# Patient Record
Sex: FEMALE | Race: WHITE | NOT HISPANIC OR LATINO
[De-identification: names, ages, dates, MRNs, and addresses within clinical notes are randomized per-mention and may not be internally consistent; named-entity substitution may affect disease eponyms.]

---

## 2017-10-02 ENCOUNTER — RX RENEWAL (OUTPATIENT)
Age: 77
End: 2017-10-02

## 2017-10-02 ENCOUNTER — MEDICATION RENEWAL (OUTPATIENT)
Age: 77
End: 2017-10-02

## 2018-06-05 ENCOUNTER — APPOINTMENT (OUTPATIENT)
Dept: NEPHROLOGY | Facility: CLINIC | Age: 78
End: 2018-06-05
Payer: MEDICARE

## 2018-06-05 VITALS — DIASTOLIC BLOOD PRESSURE: 82 MMHG | SYSTOLIC BLOOD PRESSURE: 148 MMHG

## 2018-06-05 VITALS
DIASTOLIC BLOOD PRESSURE: 85 MMHG | HEIGHT: 60 IN | WEIGHT: 164.68 LBS | BODY MASS INDEX: 32.33 KG/M2 | HEART RATE: 77 BPM | SYSTOLIC BLOOD PRESSURE: 141 MMHG | OXYGEN SATURATION: 97 %

## 2018-06-05 PROCEDURE — 99214 OFFICE O/P EST MOD 30 MIN: CPT

## 2018-06-27 LAB
25(OH)D3 SERPL-MCNC: 28.5 NG/ML
ALBUMIN SERPL ELPH-MCNC: 4.4 G/DL
ALP BLD-CCNC: 80 U/L
ALT SERPL-CCNC: 18 U/L
ANION GAP SERPL CALC-SCNC: 14 MMOL/L
APPEARANCE: CLEAR
AST SERPL-CCNC: 21 U/L
BACTERIA UR CULT: NORMAL
BACTERIA: NEGATIVE
BASOPHILS # BLD AUTO: 0.04 K/UL
BASOPHILS NFR BLD AUTO: 0.8 %
BILIRUB SERPL-MCNC: 0.6 MG/DL
BILIRUBIN URINE: NEGATIVE
BLOOD URINE: ABNORMAL
BUN SERPL-MCNC: 20 MG/DL
CALCIUM SERPL-MCNC: 9.8 MG/DL
CHLORIDE SERPL-SCNC: 101 MMOL/L
CHOLEST SERPL-MCNC: 229 MG/DL
CHOLEST/HDLC SERPL: 3.8 RATIO
CO2 SERPL-SCNC: 28 MMOL/L
COLOR: YELLOW
CREAT SERPL-MCNC: 0.83 MG/DL
CREAT SPEC-SCNC: 97 MG/DL
CREAT SPEC-SCNC: 97 MG/DL
CREAT/PROT UR: 0.1 RATIO
EOSINOPHIL # BLD AUTO: 0.1 K/UL
EOSINOPHIL NFR BLD AUTO: 2 %
FERRITIN SERPL-MCNC: 81 NG/ML
GLUCOSE QUALITATIVE U: NEGATIVE MG/DL
GLUCOSE SERPL-MCNC: 102 MG/DL
HBA1C MFR BLD HPLC: 5.4 %
HCT VFR BLD CALC: 45.7 %
HDLC SERPL-MCNC: 61 MG/DL
HGB BLD-MCNC: 14.6 G/DL
HYALINE CASTS: 1 /LPF
IMM GRANULOCYTES NFR BLD AUTO: 0.2 %
IRON SATN MFR SERPL: 25 %
IRON SERPL-MCNC: 85 UG/DL
KETONES URINE: NEGATIVE
LDLC SERPL CALC-MCNC: 146 MG/DL
LEUKOCYTE ESTERASE URINE: ABNORMAL
LYMPHOCYTES # BLD AUTO: 1.97 K/UL
LYMPHOCYTES NFR BLD AUTO: 39.5 %
MAGNESIUM SERPL-MCNC: 1.8 MG/DL
MAN DIFF?: NORMAL
MCHC RBC-ENTMCNC: 30 PG
MCHC RBC-ENTMCNC: 31.9 GM/DL
MCV RBC AUTO: 93.8 FL
MICROALBUMIN 24H UR DL<=1MG/L-MCNC: 4.4 MG/DL
MICROALBUMIN/CREAT 24H UR-RTO: 45 MG/G
MICROSCOPIC-UA: NORMAL
MONOCYTES # BLD AUTO: 0.32 K/UL
MONOCYTES NFR BLD AUTO: 6.4 %
NEUTROPHILS # BLD AUTO: 2.55 K/UL
NEUTROPHILS NFR BLD AUTO: 51.1 %
NITRITE URINE: NEGATIVE
PH URINE: 6.5
PHOSPHATE SERPL-MCNC: 3.3 MG/DL
PLATELET # BLD AUTO: 259 K/UL
POTASSIUM SERPL-SCNC: 4.6 MMOL/L
PROT SERPL-MCNC: 6.9 G/DL
PROT UR-MCNC: 13 MG/DL
PROTEIN URINE: ABNORMAL MG/DL
RBC # BLD: 4.87 M/UL
RBC # FLD: 13.3 %
RED BLOOD CELLS URINE: 2 /HPF
SODIUM SERPL-SCNC: 143 MMOL/L
SPECIFIC GRAVITY URINE: 1.02
SQUAMOUS EPITHELIAL CELLS: 3 /HPF
TIBC SERPL-MCNC: 339 UG/DL
TRIGL SERPL-MCNC: 110 MG/DL
TSH SERPL-ACNC: 1.69 UIU/ML
UIBC SERPL-MCNC: 254 UG/DL
URATE SERPL-MCNC: 5.2 MG/DL
UROBILINOGEN URINE: NEGATIVE MG/DL
WBC # FLD AUTO: 4.99 K/UL
WHITE BLOOD CELLS URINE: 10 /HPF

## 2018-10-08 ENCOUNTER — MEDICATION RENEWAL (OUTPATIENT)
Age: 78
End: 2018-10-08

## 2018-10-25 ENCOUNTER — APPOINTMENT (OUTPATIENT)
Dept: NEPHROLOGY | Facility: CLINIC | Age: 78
End: 2018-10-25
Payer: MEDICARE

## 2018-10-25 VITALS — SYSTOLIC BLOOD PRESSURE: 178 MMHG | DIASTOLIC BLOOD PRESSURE: 80 MMHG

## 2018-10-25 VITALS — BODY MASS INDEX: 29.69 KG/M2 | WEIGHT: 152 LBS

## 2018-10-25 PROCEDURE — 99214 OFFICE O/P EST MOD 30 MIN: CPT

## 2018-10-26 LAB
ALBUMIN SERPL ELPH-MCNC: 4.4 G/DL
ANION GAP SERPL CALC-SCNC: 13 MMOL/L
APPEARANCE: CLEAR
BACTERIA: NEGATIVE
BASOPHILS # BLD AUTO: 0.04 K/UL
BASOPHILS NFR BLD AUTO: 0.8 %
BILIRUBIN URINE: NEGATIVE
BLOOD URINE: NEGATIVE
BUN SERPL-MCNC: 16 MG/DL
CALCIUM SERPL-MCNC: 9.8 MG/DL
CHLORIDE SERPL-SCNC: 100 MMOL/L
CHOLEST SERPL-MCNC: 199 MG/DL
CHOLEST/HDLC SERPL: 2.9 RATIO
CO2 SERPL-SCNC: 28 MMOL/L
COLOR: YELLOW
CREAT SERPL-MCNC: 0.77 MG/DL
CREAT SPEC-SCNC: 57 MG/DL
CREAT/PROT UR: 0.2 RATIO
EOSINOPHIL # BLD AUTO: 0.06 K/UL
EOSINOPHIL NFR BLD AUTO: 1.1 %
FERRITIN SERPL-MCNC: 154 NG/ML
GLUCOSE QUALITATIVE U: NEGATIVE MG/DL
GLUCOSE SERPL-MCNC: 102 MG/DL
HBA1C MFR BLD HPLC: 5.3 %
HCT VFR BLD CALC: 40.8 %
HDLC SERPL-MCNC: 68 MG/DL
HGB BLD-MCNC: 13.9 G/DL
HYALINE CASTS: 1 /LPF
IMM GRANULOCYTES NFR BLD AUTO: 0.4 %
IRON SATN MFR SERPL: 17 %
IRON SERPL-MCNC: 52 UG/DL
KETONES URINE: NEGATIVE
LDLC SERPL CALC-MCNC: 113 MG/DL
LEUKOCYTE ESTERASE URINE: ABNORMAL
LYMPHOCYTES # BLD AUTO: 1.84 K/UL
LYMPHOCYTES NFR BLD AUTO: 35.1 %
MAGNESIUM SERPL-MCNC: 1.8 MG/DL
MAN DIFF?: NORMAL
MCHC RBC-ENTMCNC: 31.2 PG
MCHC RBC-ENTMCNC: 34.1 GM/DL
MCV RBC AUTO: 91.7 FL
MICROSCOPIC-UA: NORMAL
MONOCYTES # BLD AUTO: 0.43 K/UL
MONOCYTES NFR BLD AUTO: 8.2 %
NEUTROPHILS # BLD AUTO: 2.85 K/UL
NEUTROPHILS NFR BLD AUTO: 54.4 %
NITRITE URINE: NEGATIVE
PH URINE: 7
PHOSPHATE SERPL-MCNC: 3.4 MG/DL
PLATELET # BLD AUTO: 235 K/UL
POTASSIUM SERPL-SCNC: 3.8 MMOL/L
PROT UR-MCNC: 10 MG/DL
PROTEIN URINE: NEGATIVE MG/DL
RBC # BLD: 4.45 M/UL
RBC # FLD: 13.4 %
RED BLOOD CELLS URINE: 4 /HPF
SODIUM SERPL-SCNC: 141 MMOL/L
SPECIFIC GRAVITY URINE: 1.01
SQUAMOUS EPITHELIAL CELLS: 4 /HPF
TIBC SERPL-MCNC: 305 UG/DL
TRIGL SERPL-MCNC: 88 MG/DL
UIBC SERPL-MCNC: 253 UG/DL
URATE SERPL-MCNC: 4 MG/DL
UROBILINOGEN URINE: NEGATIVE MG/DL
WBC # FLD AUTO: 5.24 K/UL
WHITE BLOOD CELLS URINE: 10 /HPF

## 2018-10-31 ENCOUNTER — FORM ENCOUNTER (OUTPATIENT)
Age: 78
End: 2018-10-31

## 2018-10-31 ENCOUNTER — APPOINTMENT (OUTPATIENT)
Dept: INTERNAL MEDICINE | Facility: CLINIC | Age: 78
End: 2018-10-31
Payer: MEDICARE

## 2018-10-31 VITALS
SYSTOLIC BLOOD PRESSURE: 138 MMHG | BODY MASS INDEX: 30.04 KG/M2 | DIASTOLIC BLOOD PRESSURE: 74 MMHG | RESPIRATION RATE: 14 BRPM | TEMPERATURE: 98.5 F | HEIGHT: 60 IN | OXYGEN SATURATION: 97 % | WEIGHT: 153 LBS | HEART RATE: 82 BPM

## 2018-10-31 PROCEDURE — 99214 OFFICE O/P EST MOD 30 MIN: CPT

## 2018-10-31 PROCEDURE — 90662 IIV NO PRSV INCREASED AG IM: CPT

## 2018-10-31 PROCEDURE — G0008: CPT

## 2018-10-31 NOTE — HISTORY OF PRESENT ILLNESS
[FreeTextEntry8] : \par 77 yo F pmhx HTN 2/2 hx b/l fibromuscular dysplasia s/p unilateral angioplasty, preDM, HLD, right sciatica here for acute visit\par \par c/o right buttock pain on/off since spring 2018, most recent x 2 weeks\par -sharp focal pain, on/off, not felt with laying down or sitting, onset with walking mainly\par -denies pain radiation or lower back involvement, trouble with ambulation, incontinence, paresthesias or focal weakness\par -denies hx trauma; has been very active in recent travels to [place], [place], [place] and [place] in recent months since 6/18-8/18 w/o sx's or limitations\par -taking aleve 600 mg qd x few days- does not feel has helped.  Last taken > 24 hrs ago\par -hx Tylenol trial w/o help per pt\par -hx eval by ortho at onset in 5/18- told likely arthritis and advised PT, denies hx imaging.  Did PT - did not feel helped.\par -following with chiropractor x few days now, seen TIW for nerve stimulator use with some improvement noted.  Has had similar tx's in past with help, last was several months ago.\par -using heating pad with help\par -interested in muscle relaxer trial \par \par hx right sided sciatica- onset > 50 yrs ago after delivery of her child, has been on/off since- current pain is similar to prior, but more painful in past 2 weeks.  10/10 at its worst.\par \par hx anxiety- recent stress 2/2 losing job recently, daughters live and worries aboth them as with mental illness (bipolar)- one moving out soon\par -following with therapist with help\par -using klonopin prn on occasion\par \par Denies GI or  complaints.\par

## 2018-10-31 NOTE — PHYSICAL EXAM
[No Acute Distress] : no acute distress [Well-Appearing] : well-appearing [Normal Sclera/Conjunctiva] : normal sclera/conjunctiva [PERRL] : pupils equal round and reactive to light [EOMI] : extraocular movements intact [Supple] : supple [No Respiratory Distress] : no respiratory distress  [Clear to Auscultation] : lungs were clear to auscultation bilaterally [Normal Rate] : normal rate  [Regular Rhythm] : with a regular rhythm [Normal S1, S2] : normal S1 and S2 [No Murmur] : no murmur heard [Pedal Pulses Present] : the pedal pulses are present [No Edema] : there was no peripheral edema [Soft] : abdomen soft [Non Tender] : non-tender [No HSM] : no HSM [No CVA Tenderness] : no CVA  tenderness [No Spinal Tenderness] : no spinal tenderness [No Joint Swelling] : no joint swelling [Grossly Normal Strength/Tone] : grossly normal strength/tone [No Rash] : no rash [Normal Gait] : normal gait [No Focal Deficits] : no focal deficits [Normal Affect] : the affect was normal [de-identified] : b/l hips: FROM w/o pain  right buttock: diffuse pain at region of sciatic nerve, no rash

## 2018-10-31 NOTE — ASSESSMENT
[FreeTextEntry1] : \par 79 yo F pmhx HTN 2/2 hx b/l fibromuscular dysplasia s/p unilateral angioplasty, preDM (10/18 A1c 5.3), HLD, right sciatica here for acute visit\par \par hx sciatica- recent recurrence\par -check xrays LS-spine and right hip/pelvis (no hx prior)\par -ortho referral, encouraged\par -followed by diropractor, getting nerve stimulation with help\par -cont. heat prn\par -trial of flexeril qhs- SEs including sedation risk counseled, advised to avoid concurrent ETOH and klonopin use\par -declines oral pain pills, advised to avoid NSAIDs due to hx HTN and renal issues as may worsen- confirms understanding.  Advised trial of OTC topical lidocaine.\par -declines PT referral\par \par anxiety, stress-\par -cont f/u with therapist\par -on Klonopin prn\par \par HTN- BP wnl today\par -followed by renal\par -advised to avoid NSAIDs\par \par \par HCM\par -hx CPE 10/16 with PMD Dr. Bishop- yearly advised\par -flu shot today\par \par Pt's cell; [phone]

## 2018-10-31 NOTE — REVIEW OF SYSTEMS
[Negative] : Neurological [Fever] : no fever [Chills] : no chills [Chest Pain] : no chest pain [Palpitations] : no palpitations [Cough] : no cough [Dyspnea on Exertion] : no dyspnea on exertion [FreeTextEntry9] : see HPI [de-identified] : see HPI

## 2018-11-01 ENCOUNTER — OUTPATIENT (OUTPATIENT)
Dept: OUTPATIENT SERVICES | Facility: HOSPITAL | Age: 78
LOS: 1 days | End: 2018-11-01
Payer: MEDICARE

## 2018-11-01 ENCOUNTER — APPOINTMENT (OUTPATIENT)
Dept: ULTRASOUND IMAGING | Facility: CLINIC | Age: 78
End: 2018-11-01
Payer: MEDICARE

## 2018-11-01 ENCOUNTER — APPOINTMENT (OUTPATIENT)
Dept: RADIOLOGY | Facility: CLINIC | Age: 78
End: 2018-11-01
Payer: MEDICARE

## 2018-11-01 DIAGNOSIS — M54.30 SCIATICA, UNSPECIFIED SIDE: ICD-10-CM

## 2018-11-01 PROCEDURE — 73502 X-RAY EXAM HIP UNI 2-3 VIEWS: CPT

## 2018-11-01 PROCEDURE — 72120 X-RAY BEND ONLY L-S SPINE: CPT | Mod: 26

## 2018-11-01 PROCEDURE — 93975 VASCULAR STUDY: CPT | Mod: 26

## 2018-11-01 PROCEDURE — 73502 X-RAY EXAM HIP UNI 2-3 VIEWS: CPT | Mod: 26,RT

## 2018-11-01 PROCEDURE — 72120 X-RAY BEND ONLY L-S SPINE: CPT

## 2018-11-01 PROCEDURE — 93975 VASCULAR STUDY: CPT

## 2019-01-09 ENCOUNTER — APPOINTMENT (OUTPATIENT)
Dept: INTERNAL MEDICINE | Facility: CLINIC | Age: 79
End: 2019-01-09
Payer: MEDICARE

## 2019-01-09 VITALS
WEIGHT: 150 LBS | HEIGHT: 60 IN | DIASTOLIC BLOOD PRESSURE: 80 MMHG | RESPIRATION RATE: 18 BRPM | SYSTOLIC BLOOD PRESSURE: 134 MMHG | BODY MASS INDEX: 29.45 KG/M2 | HEART RATE: 85 BPM

## 2019-01-09 PROCEDURE — G0009: CPT

## 2019-01-09 PROCEDURE — G0439: CPT

## 2019-01-09 PROCEDURE — 36415 COLL VENOUS BLD VENIPUNCTURE: CPT

## 2019-01-09 PROCEDURE — 90670 PCV13 VACCINE IM: CPT

## 2019-01-09 NOTE — PHYSICAL EXAM
[No Acute Distress] : no acute distress [Well Nourished] : well nourished [Well Developed] : well developed [Well-Appearing] : well-appearing [Normal Sclera/Conjunctiva] : normal sclera/conjunctiva [PERRL] : pupils equal round and reactive to light [EOMI] : extraocular movements intact [Normal Outer Ear/Nose] : the outer ears and nose were normal in appearance [Normal Oropharynx] : the oropharynx was normal [No JVD] : no jugular venous distention [Supple] : supple [No Lymphadenopathy] : no lymphadenopathy [Thyroid Normal, No Nodules] : the thyroid was normal and there were no nodules present [No Respiratory Distress] : no respiratory distress  [Clear to Auscultation] : lungs were clear to auscultation bilaterally [No Accessory Muscle Use] : no accessory muscle use [Normal Rate] : normal rate  [Regular Rhythm] : with a regular rhythm [No Varicosities] : no varicosities [No Edema] : there was no peripheral edema [No Extremity Clubbing/Cyanosis] : no extremity clubbing/cyanosis [Soft] : abdomen soft [Non Tender] : non-tender [No HSM] : no HSM [Normal Bowel Sounds] : normal bowel sounds [Normal Supraclavicular Nodes] : no supraclavicular lymphadenopathy [Normal Posterior Cervical Nodes] : no posterior cervical lymphadenopathy [Normal Anterior Cervical Nodes] : no anterior cervical lymphadenopathy [No CVA Tenderness] : no CVA  tenderness [No Spinal Tenderness] : no spinal tenderness [No Joint Swelling] : no joint swelling [Grossly Normal Strength/Tone] : grossly normal strength/tone [No Rash] : no rash [No Skin Lesions] : no skin lesions [Speech Grossly Normal] : speech grossly normal [Memory Grossly Normal] : memory grossly normal [Normal Mood] : the mood was normal [Normal Insight/Judgement] : insight and judgment were intact

## 2019-01-09 NOTE — HEALTH RISK ASSESSMENT
[Good] : ~his/her~  mood as  good [] : No [No falls in past year] : Patient reported no falls in the past year [0] : 2) Feeling down, depressed, or hopeless: Not at all (0) [de-identified] : daily - wine  [Change in mental status noted] : No change in mental status noted [None] : None [With Family] : lives with family [Unemployed] : unemployed [Graduate School] : graduate school [] :  [Sexually Active] : not sexually active [Feels Safe at Home] : Feels safe at home [Fully functional (bathing, dressing, toileting, transferring, walking, feeding)] : Fully functional (bathing, dressing, toileting, transferring, walking, feeding) [Fully functional (using the telephone, shopping, preparing meals, housekeeping, doing laundry, using] : Fully functional and needs no help or supervision to perform IADLs (using the telephone, shopping, preparing meals, housekeeping, doing laundry, using transportation, managing medications and managing finances) [Reports changes in hearing] : Reports no changes in hearing [Reports changes in vision] : Reports changes in vision [Reports changes in dental health] : Reports changes in dental health [Smoke Detector] : smoke detector [Carbon Monoxide Detector] : carbon monoxide detector [MammogramDate] : needed  [BoneDensityDate] : needed  [ColonoscopyDate] : needed  [Discussed at today's visit] : Advance Directives Discussed at today's visit [Name: ___] : Health Care Proxy's Name: [unfilled]  [Relationship: ___] : Relationship: [unfilled]

## 2019-01-09 NOTE — ASSESSMENT
[FreeTextEntry1] : # Wellness check \par \par - diet / exercise discussed \par - LDL - 113/ A1C 5.3\par -check vit D \par - home safety / fall prevention discussed \par - pt needs mammo / BMD / colonoscopy \par - prevnar needed \par - optho follow up \par \par \par # HTN \par - stable\par \par # HL \par refuses to try a different med\par \par \par # anxiety \par - sees psychotherapy

## 2019-01-09 NOTE — HISTORY OF PRESENT ILLNESS
[FreeTextEntry1] : wellness check\par she is  \par lives w/ adult children \par taking all meds \par \par recently finished her Ph D \par not taking lipid lowering meds bc myalgias\par \par has not had mammo / BMD / colonoscopy \par no vaginal bleeding

## 2019-01-10 LAB
25(OH)D3 SERPL-MCNC: 27.1 NG/ML
ALBUMIN SERPL ELPH-MCNC: 4.3 G/DL
ALP BLD-CCNC: 82 U/L
ALT SERPL-CCNC: 17 U/L
AST SERPL-CCNC: 22 U/L
BILIRUB DIRECT SERPL-MCNC: 0.1 MG/DL
BILIRUB INDIRECT SERPL-MCNC: 0.3 MG/DL
BILIRUB SERPL-MCNC: 0.4 MG/DL
PROT SERPL-MCNC: 7 G/DL
TSH SERPL-ACNC: 1.32 UIU/ML

## 2019-01-23 ENCOUNTER — FORM ENCOUNTER (OUTPATIENT)
Age: 79
End: 2019-01-23

## 2019-01-24 ENCOUNTER — APPOINTMENT (OUTPATIENT)
Dept: MAMMOGRAPHY | Facility: CLINIC | Age: 79
End: 2019-01-24
Payer: MEDICARE

## 2019-01-24 ENCOUNTER — APPOINTMENT (OUTPATIENT)
Dept: RADIOLOGY | Facility: CLINIC | Age: 79
End: 2019-01-24
Payer: MEDICARE

## 2019-01-24 ENCOUNTER — OUTPATIENT (OUTPATIENT)
Dept: OUTPATIENT SERVICES | Facility: HOSPITAL | Age: 79
LOS: 1 days | End: 2019-01-24
Payer: MEDICARE

## 2019-01-24 DIAGNOSIS — Z00.8 ENCOUNTER FOR OTHER GENERAL EXAMINATION: ICD-10-CM

## 2019-01-24 PROCEDURE — 77067 SCR MAMMO BI INCL CAD: CPT | Mod: 26

## 2019-01-24 PROCEDURE — 77063 BREAST TOMOSYNTHESIS BI: CPT | Mod: 26

## 2019-01-24 PROCEDURE — 77080 DXA BONE DENSITY AXIAL: CPT

## 2019-01-24 PROCEDURE — 77063 BREAST TOMOSYNTHESIS BI: CPT

## 2019-01-24 PROCEDURE — 77067 SCR MAMMO BI INCL CAD: CPT

## 2019-01-24 PROCEDURE — 77080 DXA BONE DENSITY AXIAL: CPT | Mod: 26

## 2019-01-28 ENCOUNTER — FORM ENCOUNTER (OUTPATIENT)
Age: 79
End: 2019-01-28

## 2019-01-29 ENCOUNTER — APPOINTMENT (OUTPATIENT)
Dept: MAMMOGRAPHY | Facility: CLINIC | Age: 79
End: 2019-01-29
Payer: MEDICARE

## 2019-01-29 ENCOUNTER — OTHER (OUTPATIENT)
Age: 79
End: 2019-01-29

## 2019-01-29 ENCOUNTER — APPOINTMENT (OUTPATIENT)
Dept: ULTRASOUND IMAGING | Facility: CLINIC | Age: 79
End: 2019-01-29
Payer: MEDICARE

## 2019-01-29 ENCOUNTER — OUTPATIENT (OUTPATIENT)
Dept: OUTPATIENT SERVICES | Facility: HOSPITAL | Age: 79
LOS: 1 days | End: 2019-01-29
Payer: MEDICARE

## 2019-01-29 DIAGNOSIS — Z00.8 ENCOUNTER FOR OTHER GENERAL EXAMINATION: ICD-10-CM

## 2019-01-29 PROCEDURE — 76642 ULTRASOUND BREAST LIMITED: CPT

## 2019-01-29 PROCEDURE — 77065 DX MAMMO INCL CAD UNI: CPT | Mod: 26,RT

## 2019-01-29 PROCEDURE — 76642 ULTRASOUND BREAST LIMITED: CPT | Mod: 26,RT

## 2019-01-29 PROCEDURE — G0279: CPT | Mod: 26

## 2019-01-29 PROCEDURE — G0279: CPT

## 2019-01-29 PROCEDURE — 77065 DX MAMMO INCL CAD UNI: CPT

## 2019-02-01 ENCOUNTER — APPOINTMENT (OUTPATIENT)
Dept: NEPHROLOGY | Facility: CLINIC | Age: 79
End: 2019-02-01
Payer: MEDICARE

## 2019-02-01 VITALS
BODY MASS INDEX: 30.51 KG/M2 | HEIGHT: 60 IN | HEART RATE: 79 BPM | DIASTOLIC BLOOD PRESSURE: 73 MMHG | WEIGHT: 155.42 LBS | SYSTOLIC BLOOD PRESSURE: 143 MMHG | OXYGEN SATURATION: 98 %

## 2019-02-01 PROCEDURE — 99214 OFFICE O/P EST MOD 30 MIN: CPT

## 2019-02-01 RX ORDER — CLONAZEPAM 0.5 MG/1
0.5 TABLET ORAL
Refills: 0 | Status: DISCONTINUED | COMMUNITY
End: 2019-02-01

## 2019-02-01 RX ORDER — CYCLOBENZAPRINE HYDROCHLORIDE 5 MG/1
5 TABLET, FILM COATED ORAL
Qty: 20 | Refills: 0 | Status: DISCONTINUED | COMMUNITY
Start: 2018-10-31 | End: 2019-02-01

## 2019-02-01 NOTE — ASSESSMENT
[FreeTextEntry1] : Ms. Chavez is a 78 years old woman with FMD who presents today for follow up.\par \par Hypertension -- Secondary to fibromuscular dysplasia.  The patient's blood pressure is not presently optimized.  This is in the setting of a very salty meal last night as well as meeting a new doctor.  I explained to her that her risk for stroke and cardiovascular events is lower with a systolic closer to 120.  Therefore, we have agreed to increase her HCTZ to 50 daily to bring her slightly closer to her goal.  She will call me with her blood pressure results in one week and she will follow up to see me in six months.

## 2019-02-01 NOTE — REVIEW OF SYSTEMS
[Fever] : no fever [Chills] : no chills [Feeling Poorly] : not feeling poorly [Feeling Tired] : not feeling tired [Eyesight Problems] : no eyesight problems [Nosebleeds] : no nosebleeds [Chest Pain] : no chest pain [Lower Ext Edema] : no lower extremity edema [Shortness Of Breath] : no shortness of breath [Wheezing] : no wheezing [Cough] : no cough [SOB on Exertion] : no shortness of breath during exertion [Abdominal Pain] : no abdominal pain [Vomiting] : no vomiting [Constipation] : no constipation [Dysuria] : no dysuria [Incontinence] : no incontinence [Arthralgias] : no arthralgias [Joint Pain] : no joint pain [Itching] : no itching [Dizziness] : no dizziness [Fainting] : no fainting [Anxiety] : no anxiety [Depression] : no depression [Easy Bleeding] : no tendency for easy bleeding [Easy Bruising] : no tendency for easy bruising

## 2019-02-01 NOTE — PHYSICAL EXAM
[General Appearance - Alert] : alert [General Appearance - In No Acute Distress] : in no acute distress [General Appearance - Well Nourished] : well nourished [Outer Ear] : the ears and nose were normal in appearance [Examination Of The Oral Cavity] : the lips and gums were normal [Oropharynx] : the oropharynx was normal [Neck Appearance] : the appearance of the neck was normal [Neck Cervical Mass (___cm)] : no neck mass was observed [Jugular Venous Distention Increased] : there was no jugular-venous distention [Thyroid Nodule] : there were no palpable thyroid nodules [Auscultation Breath Sounds / Voice Sounds] : lungs were clear to auscultation bilaterally [Apical Impulse] : the apical impulse was normal [Heart Sounds] : normal S1 and S2 [Murmurs] : no murmurs [Edema] : there was no peripheral edema [Bowel Sounds] : normal bowel sounds [Abdomen Soft] : soft [Abdomen Tenderness] : non-tender [FreeTextEntry1] : not examined [Cervical Lymph Nodes Enlarged Posterior Bilaterally] : posterior cervical [Supraclavicular Lymph Nodes Enlarged Bilaterally] : supraclavicular [No CVA Tenderness] : no ~M costovertebral angle tenderness [Abnormal Walk] : normal gait [Involuntary Movements] : no involuntary movements were seen [Skin Color & Pigmentation] : normal skin color and pigmentation [Skin Turgor] : normal skin turgor [] : no rash [Motor Exam] : the motor exam was normal [Oriented To Time, Place, And Person] : oriented to person, place, and time [Impaired Insight] : insight and judgment were intact [Affect] : the affect was normal

## 2019-02-01 NOTE — HISTORY OF PRESENT ILLNESS
[FreeTextEntry1] : Today I had the pleasure of meeting Charo Chavez.  She is a 78 years old woman with an extraordinary medical and personal history.  Although she is [place] she spent the early part of her life with her  and young son traveling and backpacking across the entire world.  She has a history of bilateral fibromuscular dysplasia and is s/p unilateral angioplasty.  Her blood pressure has been well controlled over the years and recently she's been doing well on hctz 25 and losartan 100.  Of late her blood pressure has been rising.  She reports that she just returned from her trip to [place] and she had a great time there.  Since returning she changed her diet around a lot and has lost weight.  Last night she went out to eat with her daughter's boss and had an extraordinarily salty meal.\par \par 2/1/19 -- Since our last meeting Charo has had quite a few positive things in her personal / professional life.  She has been asked to help author a book, she has been asked to speak.  Overall doing quite well.  She had an abnormal mammogram recently and so she has to go and get a breast biopsy.  Her blood pressure has been slightly on the higher side, still.  A repeat doppler done after last visit did not show any evidence of FMD.

## 2019-02-05 ENCOUNTER — FORM ENCOUNTER (OUTPATIENT)
Age: 79
End: 2019-02-05

## 2019-02-06 ENCOUNTER — APPOINTMENT (OUTPATIENT)
Dept: MAMMOGRAPHY | Facility: CLINIC | Age: 79
End: 2019-02-06
Payer: MEDICARE

## 2019-02-06 ENCOUNTER — OUTPATIENT (OUTPATIENT)
Dept: OUTPATIENT SERVICES | Facility: HOSPITAL | Age: 79
LOS: 1 days | End: 2019-02-06
Payer: MEDICARE

## 2019-02-06 ENCOUNTER — RESULT REVIEW (OUTPATIENT)
Age: 79
End: 2019-02-06

## 2019-02-06 DIAGNOSIS — R92.0 MAMMOGRAPHIC MICROCALCIFICATION FOUND ON DIAGNOSTIC IMAGING OF BREAST: ICD-10-CM

## 2019-02-06 PROCEDURE — 77065 DX MAMMO INCL CAD UNI: CPT

## 2019-02-06 PROCEDURE — 19081 BX BREAST 1ST LESION STRTCTC: CPT

## 2019-02-06 PROCEDURE — 88305 TISSUE EXAM BY PATHOLOGIST: CPT | Mod: 26

## 2019-02-06 PROCEDURE — 19081 BX BREAST 1ST LESION STRTCTC: CPT | Mod: RT

## 2019-02-06 PROCEDURE — 88360 TUMOR IMMUNOHISTOCHEM/MANUAL: CPT | Mod: 26

## 2019-02-06 PROCEDURE — 77065 DX MAMMO INCL CAD UNI: CPT | Mod: 26,RT

## 2019-02-07 LAB
ALBUMIN SERPL ELPH-MCNC: 4.3 G/DL
ANION GAP SERPL CALC-SCNC: 13 MMOL/L
APPEARANCE: CLEAR
BACTERIA: NEGATIVE
BASOPHILS # BLD AUTO: 0.04 K/UL
BASOPHILS NFR BLD AUTO: 0.9 %
BILIRUBIN URINE: NEGATIVE
BLOOD URINE: NEGATIVE
BUN SERPL-MCNC: 16 MG/DL
CALCIUM SERPL-MCNC: 9.9 MG/DL
CHLORIDE SERPL-SCNC: 102 MMOL/L
CO2 SERPL-SCNC: 27 MMOL/L
COLOR: YELLOW
CREAT SERPL-MCNC: 0.85 MG/DL
CREAT SPEC-SCNC: 44 MG/DL
CREAT/PROT UR: 0.2 RATIO
EOSINOPHIL # BLD AUTO: 0.05 K/UL
EOSINOPHIL NFR BLD AUTO: 1.1 %
FERRITIN SERPL-MCNC: 87 NG/ML
GLUCOSE QUALITATIVE U: NEGATIVE MG/DL
GLUCOSE SERPL-MCNC: 94 MG/DL
HCT VFR BLD CALC: 43 %
HGB BLD-MCNC: 14.2 G/DL
HYALINE CASTS: 2 /LPF
IMM GRANULOCYTES NFR BLD AUTO: 0.5 %
IRON SATN MFR SERPL: 26 %
IRON SERPL-MCNC: 90 UG/DL
KETONES URINE: NEGATIVE
LEUKOCYTE ESTERASE URINE: ABNORMAL
LYMPHOCYTES # BLD AUTO: 1.47 K/UL
LYMPHOCYTES NFR BLD AUTO: 33.8 %
MAN DIFF?: NORMAL
MCHC RBC-ENTMCNC: 30.3 PG
MCHC RBC-ENTMCNC: 33 GM/DL
MCV RBC AUTO: 91.7 FL
MICROSCOPIC-UA: NORMAL
MONOCYTES # BLD AUTO: 0.28 K/UL
MONOCYTES NFR BLD AUTO: 6.4 %
NEUTROPHILS # BLD AUTO: 2.49 K/UL
NEUTROPHILS NFR BLD AUTO: 57.3 %
NITRITE URINE: NEGATIVE
PH URINE: 7
PHOSPHATE SERPL-MCNC: 3.1 MG/DL
PLATELET # BLD AUTO: 237 K/UL
POTASSIUM SERPL-SCNC: 3.9 MMOL/L
PROT UR-MCNC: 7 MG/DL
PROTEIN URINE: NEGATIVE MG/DL
RBC # BLD: 4.69 M/UL
RBC # FLD: 12.8 %
RED BLOOD CELLS URINE: 2 /HPF
SODIUM SERPL-SCNC: 142 MMOL/L
SPECIFIC GRAVITY URINE: 1.01
SQUAMOUS EPITHELIAL CELLS: 2 /HPF
TIBC SERPL-MCNC: 342 UG/DL
UIBC SERPL-MCNC: 252 UG/DL
URATE SERPL-MCNC: 3.5 MG/DL
UROBILINOGEN URINE: NEGATIVE MG/DL
WBC # FLD AUTO: 4.35 K/UL
WHITE BLOOD CELLS URINE: 13 /HPF

## 2019-02-08 ENCOUNTER — TRANSCRIPTION ENCOUNTER (OUTPATIENT)
Age: 79
End: 2019-02-08

## 2019-02-12 ENCOUNTER — OTHER (OUTPATIENT)
Age: 79
End: 2019-02-12

## 2019-02-21 ENCOUNTER — APPOINTMENT (OUTPATIENT)
Dept: SURGERY | Facility: CLINIC | Age: 79
End: 2019-02-21
Payer: MEDICARE

## 2019-02-21 VITALS
BODY MASS INDEX: 29.45 KG/M2 | TEMPERATURE: 98.3 F | OXYGEN SATURATION: 98 % | RESPIRATION RATE: 15 BRPM | SYSTOLIC BLOOD PRESSURE: 142 MMHG | WEIGHT: 150 LBS | HEIGHT: 60 IN | HEART RATE: 81 BPM | DIASTOLIC BLOOD PRESSURE: 76 MMHG

## 2019-02-21 DIAGNOSIS — Z82.49 FAMILY HISTORY OF ISCHEMIC HEART DISEASE AND OTHER DISEASES OF THE CIRCULATORY SYSTEM: ICD-10-CM

## 2019-02-21 DIAGNOSIS — Z80.52 FAMILY HISTORY OF MALIGNANT NEOPLASM OF BLADDER: ICD-10-CM

## 2019-02-21 DIAGNOSIS — Z82.3 FAMILY HISTORY OF STROKE: ICD-10-CM

## 2019-02-21 PROCEDURE — 99204 OFFICE O/P NEW MOD 45 MIN: CPT

## 2019-02-21 NOTE — PHYSICAL EXAM
[Normal Thyroid] : the thyroid was normal [Normal Breath Sounds] : Normal breath sounds [Respiratory Effort] : normal respiratory effort [Normal Heart Sounds] : normal heart sounds [Normal Rate and Rhythm] : normal rate and rhythm [No Rash or Lesion] : No rash or lesion [Alert] : alert [Oriented to Person] : oriented to person [Oriented to Place] : oriented to place [Oriented to Time] : oriented to time [Calm] : calm [JVD] : no jugular venous distention  [de-identified] : well-developed, well-nourished female in no acute distress [de-identified] : within normal limits; wears glasses [de-identified] : Examination of both breasts in the sitting and supine position fails to reveal any dominant masses. There is some mild ecchymosis in the lower aspect of the right breast with core biopsy was done. Both axilla are negative for any adenopathy. [de-identified] : normal strength and gait, full ROM

## 2019-02-21 NOTE — CONSULT LETTER
[Dear  ___] : Dear  [unfilled], [Consult Letter:] : I had the pleasure of evaluating your patient, [unfilled]. [Please see my note below.] : Please see my note below. [Consult Closing:] : Thank you very much for allowing me to participate in the care of this patient.  If you have any questions, please do not hesitate to contact me. [Sincerely,] : Sincerely, [FreeTextEntry3] : I have reviewed all the documentation for this encounter with the patient and have edited where appropriate\par \par Dr. Demetrius Mendes [DrCherelle  ___] : Dr. DELGADO

## 2019-02-21 NOTE — REASON FOR VISIT
[Consultation] : a consultation visit [Family Member] : family member [FreeTextEntry1] : DCIS, right breast

## 2019-02-21 NOTE — HISTORY OF PRESENT ILLNESS
[de-identified] : Charo is a 79 y/o female here for a consultation for DCIS. Patient had screening mammogram on 01/24/19 that showed a mass in the lower inner quadrant of the right breast; BiRADS: 0. Follow-up mammo on 01/29/19 showed a 10 mm irregular focal asymmetry of the posterior lower central right breast. US of the right breast on 01/29/19 showed no sonographic evidence of malignancy. She had a right stereotactic core needle biopsy on 02/06/19. Pathology: DCIS, cribriform, and solid papillary patterns with intermediate grade nuclear atypia. Patient denies any breast pain or nipple discharge.

## 2019-02-21 NOTE — ASSESSMENT
[FreeTextEntry1] : I had a detailed discussion with the patient about the biopsy of the DCIS of her right breast. We'll be using a alesha  technique. I've explained this in great detail with the patient.  I have reviewed the pathology report with the patient  I reviewed the images of the mammogram with clip in place with the patient.\par \par The patient understands she will need medical evaluation prior to this surgical procedure.\par

## 2019-04-12 ENCOUNTER — NON-APPOINTMENT (OUTPATIENT)
Age: 79
End: 2019-04-12

## 2019-04-12 ENCOUNTER — APPOINTMENT (OUTPATIENT)
Dept: INTERNAL MEDICINE | Facility: CLINIC | Age: 79
End: 2019-04-12
Payer: MEDICARE

## 2019-04-12 VITALS — DIASTOLIC BLOOD PRESSURE: 80 MMHG | SYSTOLIC BLOOD PRESSURE: 140 MMHG

## 2019-04-12 VITALS
SYSTOLIC BLOOD PRESSURE: 150 MMHG | TEMPERATURE: 98 F | HEIGHT: 60 IN | WEIGHT: 148 LBS | DIASTOLIC BLOOD PRESSURE: 84 MMHG | OXYGEN SATURATION: 96 % | HEART RATE: 83 BPM | BODY MASS INDEX: 29.06 KG/M2

## 2019-04-12 PROCEDURE — 36415 COLL VENOUS BLD VENIPUNCTURE: CPT

## 2019-04-12 PROCEDURE — 99214 OFFICE O/P EST MOD 30 MIN: CPT | Mod: 25

## 2019-04-12 PROCEDURE — 93000 ELECTROCARDIOGRAM COMPLETE: CPT

## 2019-04-12 NOTE — PHYSICAL EXAM
[No Acute Distress] : no acute distress [Normal Sclera/Conjunctiva] : normal sclera/conjunctiva [Supple] : supple [Normal Oropharynx] : the oropharynx was normal [No Respiratory Distress] : no respiratory distress  [Clear to Auscultation] : lungs were clear to auscultation bilaterally [Normal Rate] : normal rate  [Normal S1, S2] : normal S1 and S2 [Regular Rhythm] : with a regular rhythm [No Murmur] : no murmur heard [No Edema] : there was no peripheral edema [Soft] : abdomen soft [Non Tender] : non-tender

## 2019-04-15 ENCOUNTER — OTHER (OUTPATIENT)
Age: 79
End: 2019-04-15

## 2019-04-15 LAB
ANION GAP SERPL CALC-SCNC: 11 MMOL/L
BASOPHILS # BLD AUTO: 0.04 K/UL
BASOPHILS NFR BLD AUTO: 0.7 %
BUN SERPL-MCNC: 22 MG/DL
CALCIUM SERPL-MCNC: 9.9 MG/DL
CHLORIDE SERPL-SCNC: 101 MMOL/L
CO2 SERPL-SCNC: 28 MMOL/L
CREAT SERPL-MCNC: 0.83 MG/DL
EOSINOPHIL # BLD AUTO: 0.06 K/UL
EOSINOPHIL NFR BLD AUTO: 1.1 %
GLUCOSE SERPL-MCNC: 105 MG/DL
HCT VFR BLD CALC: 43.8 %
HGB BLD-MCNC: 14.1 G/DL
IMM GRANULOCYTES NFR BLD AUTO: 0.2 %
LYMPHOCYTES # BLD AUTO: 2.09 K/UL
LYMPHOCYTES NFR BLD AUTO: 37.1 %
MAN DIFF?: NORMAL
MCHC RBC-ENTMCNC: 30.2 PG
MCHC RBC-ENTMCNC: 32.2 GM/DL
MCV RBC AUTO: 93.8 FL
MONOCYTES # BLD AUTO: 0.44 K/UL
MONOCYTES NFR BLD AUTO: 7.8 %
NEUTROPHILS # BLD AUTO: 2.99 K/UL
NEUTROPHILS NFR BLD AUTO: 53.1 %
PLATELET # BLD AUTO: 252 K/UL
POTASSIUM SERPL-SCNC: 3.7 MMOL/L
RBC # BLD: 4.67 M/UL
RBC # FLD: 12.5 %
SODIUM SERPL-SCNC: 140 MMOL/L
WBC # FLD AUTO: 5.63 K/UL

## 2019-04-15 NOTE — HISTORY OF PRESENT ILLNESS
[No Pertinent Pulmonary History] : no history of asthma, COPD, sleep apnea, or smoking [No Adverse Anesthesia Reaction] : no adverse anesthesia reaction in self or family member [No Pertinent Cardiac History] : no history of aortic stenosis, atrial fibrillation, coronary artery disease, recent myocardial infarction, or implantable device/pacemaker [(Patient denies any chest pain, claudication, dyspnea on exertion, orthopnea, palpitations or syncope)] : Patient denies any chest pain, claudication, dyspnea on exertion, orthopnea, palpitations or syncope [Chronic Anticoagulation] : no chronic anticoagulation [Diabetes] : no diabetes [FreeTextEntry2] : 4/24/19 [FreeTextEntry1] : Breast surgery (right excisional breast biopsy with Ruth   [FreeTextEntry3] : Demetrius Mendes [FreeTextEntry4] : Ms. france is  a 77 y/o female with a hx of HTN, b/l fibromuscular dysplasia here for preoperative evaluation prior to Breast surgery on 4/24 after recent diagnosis of DCIS\par \par Has an appointment next week but is not sure if it is for presurgical testing or not [FreeTextEntry7] : no prior cardiac testing

## 2019-04-15 NOTE — ASSESSMENT
[High Risk Surgery - Intraperitoneal, Intrathoracic or Supringuinal Vascular Procedures] : High Risk Surgery - Intraperitoneal, Intrathoracic or Supringuinal Vascular Procedures - No (0) [Ischemic Heart Disease] : Ischemic Heart Disease - No (0) [Congestive Heart Failure] : Congestive Heart Failure - No (0) [Prior Cerebrovascular Accident or TIA] : Prior Cerebrovascular Accident or TIA - No (0) [Creatinine >= 2mg/dL (1 Point)] : Creatinine >= 2mg/dL - No (0) [Insulin-dependent Diabetic (1 Point)] : Insulin-dependent Diabetic - No (0) [0] : 0 , RCRI Class: I, Risk of Post-Op Cardiac Complications: 0.4%, Procedure Risk: Low-Risk [Continue medications as is] : Continue current medications [Patient Optimized for Surgery] : Patient optimized for surgery [FreeTextEntry7] : Continue daily anithypertensives, advised to hold supplements until after procedure.   [FreeTextEntry4] : 79 y/o female with HTN/fibromuscular dysplasia, recetn diagnosis of DCIS, here for preoperative assesment prior to right breast surgery\par \par Patient is acceptable risk for the planned low risk procedure.  Patient has no active cardiac conditions and acceptable METs (>4).  EKG was reviewed and without ischemic changes. CBC and BMP are within normal limits.    \par \par \par Patient may proceed with planned surgery without further workup.\par

## 2019-04-15 NOTE — RESULTS/DATA
[de-identified] : WNL [] : results reviewed [de-identified] : WNL [de-identified] : No ischemic changes

## 2019-04-17 ENCOUNTER — FORM ENCOUNTER (OUTPATIENT)
Age: 79
End: 2019-04-17

## 2019-04-18 ENCOUNTER — APPOINTMENT (OUTPATIENT)
Dept: MAMMOGRAPHY | Facility: IMAGING CENTER | Age: 79
End: 2019-04-18
Payer: MEDICARE

## 2019-04-18 ENCOUNTER — OUTPATIENT (OUTPATIENT)
Dept: OUTPATIENT SERVICES | Facility: HOSPITAL | Age: 79
LOS: 1 days | End: 2019-04-18
Payer: MEDICARE

## 2019-04-18 ENCOUNTER — OUTPATIENT (OUTPATIENT)
Dept: OUTPATIENT SERVICES | Facility: HOSPITAL | Age: 79
LOS: 1 days | End: 2019-04-18

## 2019-04-18 VITALS
HEIGHT: 60 IN | DIASTOLIC BLOOD PRESSURE: 92 MMHG | WEIGHT: 149.91 LBS | OXYGEN SATURATION: 98 % | HEART RATE: 77 BPM | RESPIRATION RATE: 16 BRPM | SYSTOLIC BLOOD PRESSURE: 145 MMHG | TEMPERATURE: 98 F

## 2019-04-18 DIAGNOSIS — Z91.040 LATEX ALLERGY STATUS: ICD-10-CM

## 2019-04-18 DIAGNOSIS — D05.10 INTRADUCTAL CARCINOMA IN SITU OF UNSPECIFIED BREAST: ICD-10-CM

## 2019-04-18 DIAGNOSIS — C50.911 MALIGNANT NEOPLASM OF UNSPECIFIED SITE OF RIGHT FEMALE BREAST: ICD-10-CM

## 2019-04-18 DIAGNOSIS — Z00.8 ENCOUNTER FOR OTHER GENERAL EXAMINATION: ICD-10-CM

## 2019-04-18 DIAGNOSIS — Z01.818 ENCOUNTER FOR OTHER PREPROCEDURAL EXAMINATION: ICD-10-CM

## 2019-04-18 PROCEDURE — 19281 PERQ DEVICE BREAST 1ST IMAG: CPT | Mod: RT

## 2019-04-18 PROCEDURE — G0463: CPT

## 2019-04-18 PROCEDURE — C1739: CPT

## 2019-04-18 PROCEDURE — 19281 PERQ DEVICE BREAST 1ST IMAG: CPT

## 2019-04-18 RX ORDER — SODIUM CHLORIDE 9 MG/ML
3 INJECTION INTRAMUSCULAR; INTRAVENOUS; SUBCUTANEOUS EVERY 8 HOURS
Qty: 0 | Refills: 0 | Status: DISCONTINUED | OUTPATIENT
Start: 2019-04-24 | End: 2019-05-09

## 2019-04-18 RX ORDER — LIDOCAINE HCL 20 MG/ML
0.2 VIAL (ML) INJECTION ONCE
Qty: 0 | Refills: 0 | Status: DISCONTINUED | OUTPATIENT
Start: 2019-04-24 | End: 2019-05-09

## 2019-04-18 RX ORDER — CHLORHEXIDINE GLUCONATE 213 G/1000ML
1 SOLUTION TOPICAL DAILY
Qty: 0 | Refills: 0 | Status: DISCONTINUED | OUTPATIENT
Start: 2019-04-24 | End: 2019-05-09

## 2019-04-18 NOTE — H&P PST ADULT - NSANTHOSAYNRD_GEN_A_CORE
No. CHARLENE screening performed.  STOP BANG Legend: 0-2 = LOW Risk; 3-4 = INTERMEDIATE Risk; 5-8 = HIGH Risk

## 2019-04-18 NOTE — H&P PST ADULT - NSICDXPROBLEM_GEN_ALL_CORE_FT
PROBLEM DIAGNOSES  Problem: Ductal carcinoma of breast, right  Assessment and Plan: Right Breast Biopsy Post Ruth  Reflector Placement.       Problem: Latex allergy  Assessment and Plan: OR booking notified.

## 2019-04-18 NOTE — H&P PST ADULT - NSICDXPASTMEDICALHX_GEN_ALL_CORE_FT
PAST MEDICAL HISTORY:  Chronic sciatica - Right side    History of fibromuscular dysplasia s/p Additional Bilateral Renal Artery Catheterization via Aorta in 1980s - last doppler  - no evidence of FMD as per Dr. 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

## 2019-04-18 NOTE — H&P PST ADULT - GENITOURINARY COMMENTS
fibromuscular dysplasia s/p Additional Bilateral Renal Artery Catheterization via Aorta in 1980s - last doppler  - no evidence of FMD as per Dr. SUZETTE Kenny (nephrology)

## 2019-04-18 NOTE — H&P PST ADULT - BREAST SYMPTOMS
breast tenderness R/breast tenderness L/nipple discharge L/nipple discharge R/Right breast mass Right breast mass

## 2019-04-18 NOTE — H&P PST ADULT - HISTORY OF PRESENT ILLNESS
Patient is a 78 y.o. female with h/o HTN who underwent a screening mammogram on 01/14/19 which showed Right breast mass. Stereotactic core needle Bx confirmed DCIS. Patient underwent Ruth  Reflector Placement. She is now scheduled for Right Breast Biopsy Post Ruth  Reflector Placement.

## 2019-04-19 PROBLEM — D05.10 INTRADUCTAL CARCINOMA IN SITU OF UNSPECIFIED BREAST: Chronic | Status: ACTIVE | Noted: 2019-04-18

## 2019-04-19 PROBLEM — M54.30 SCIATICA, UNSPECIFIED SIDE: Chronic | Status: ACTIVE | Noted: 2019-04-18

## 2019-04-19 PROBLEM — Z87.39 PERSONAL HISTORY OF OTHER DISEASES OF THE MUSCULOSKELETAL SYSTEM AND CONNECTIVE TISSUE: Chronic | Status: ACTIVE | Noted: 2019-04-18

## 2019-04-23 ENCOUNTER — FORM ENCOUNTER (OUTPATIENT)
Age: 79
End: 2019-04-23

## 2019-04-23 RX ORDER — CELECOXIB 200 MG/1
200 CAPSULE ORAL ONCE
Qty: 0 | Refills: 0 | Status: COMPLETED | OUTPATIENT
Start: 2019-04-24 | End: 2019-04-24

## 2019-04-23 RX ORDER — ONDANSETRON 8 MG/1
4 TABLET, FILM COATED ORAL ONCE
Qty: 0 | Refills: 0 | Status: COMPLETED | OUTPATIENT
Start: 2019-04-24 | End: 2019-04-24

## 2019-04-23 RX ORDER — SODIUM CHLORIDE 9 MG/ML
1000 INJECTION, SOLUTION INTRAVENOUS
Qty: 0 | Refills: 0 | Status: DISCONTINUED | OUTPATIENT
Start: 2019-04-24 | End: 2019-05-09

## 2019-04-23 RX ORDER — OXYCODONE HYDROCHLORIDE 5 MG/1
5 TABLET ORAL ONCE
Qty: 0 | Refills: 0 | Status: DISCONTINUED | OUTPATIENT
Start: 2019-04-24 | End: 2019-04-24

## 2019-04-24 ENCOUNTER — RESULT REVIEW (OUTPATIENT)
Age: 79
End: 2019-04-24

## 2019-04-24 ENCOUNTER — OUTPATIENT (OUTPATIENT)
Dept: OUTPATIENT SERVICES | Facility: HOSPITAL | Age: 79
LOS: 1 days | End: 2019-04-24
Payer: MEDICARE

## 2019-04-24 ENCOUNTER — APPOINTMENT (OUTPATIENT)
Dept: SURGERY | Facility: HOSPITAL | Age: 79
End: 2019-04-24
Payer: MEDICARE

## 2019-04-24 VITALS
HEART RATE: 66 BPM | DIASTOLIC BLOOD PRESSURE: 69 MMHG | RESPIRATION RATE: 14 BRPM | SYSTOLIC BLOOD PRESSURE: 148 MMHG | TEMPERATURE: 98 F | OXYGEN SATURATION: 98 %

## 2019-04-24 VITALS
TEMPERATURE: 98 F | HEIGHT: 60 IN | RESPIRATION RATE: 16 BRPM | SYSTOLIC BLOOD PRESSURE: 145 MMHG | HEART RATE: 77 BPM | WEIGHT: 149.91 LBS | OXYGEN SATURATION: 98 % | DIASTOLIC BLOOD PRESSURE: 92 MMHG

## 2019-04-24 DIAGNOSIS — D05.10 INTRADUCTAL CARCINOMA IN SITU OF UNSPECIFIED BREAST: ICD-10-CM

## 2019-04-24 PROCEDURE — 19303 MAST SIMPLE COMPLETE: CPT

## 2019-04-24 PROCEDURE — 88307 TISSUE EXAM BY PATHOLOGIST: CPT

## 2019-04-24 PROCEDURE — 19301 PARTIAL MASTECTOMY: CPT | Mod: RT

## 2019-04-24 PROCEDURE — 76098 X-RAY EXAM SURGICAL SPECIMEN: CPT | Mod: 26

## 2019-04-24 PROCEDURE — 76098 X-RAY EXAM SURGICAL SPECIMEN: CPT

## 2019-04-24 PROCEDURE — 88307 TISSUE EXAM BY PATHOLOGIST: CPT | Mod: 26

## 2019-04-24 RX ORDER — LOSARTAN POTASSIUM 100 MG/1
1 TABLET, FILM COATED ORAL
Qty: 0 | Refills: 0 | COMMUNITY

## 2019-04-24 RX ORDER — VANCOMYCIN HCL 1 G
1000 VIAL (EA) INTRAVENOUS ONCE
Qty: 0 | Refills: 0 | Status: COMPLETED | OUTPATIENT
Start: 2019-04-24 | End: 2019-04-24

## 2019-04-24 RX ORDER — APREPITANT 80 MG/1
40 CAPSULE ORAL ONCE
Qty: 0 | Refills: 0 | Status: COMPLETED | OUTPATIENT
Start: 2019-04-24 | End: 2019-04-24

## 2019-04-24 RX ORDER — CELECOXIB 200 MG/1
200 CAPSULE ORAL ONCE
Qty: 0 | Refills: 0 | Status: COMPLETED | OUTPATIENT
Start: 2019-04-24 | End: 2019-04-24

## 2019-04-24 RX ORDER — ACETAMINOPHEN 500 MG
1000 TABLET ORAL ONCE
Qty: 0 | Refills: 0 | Status: COMPLETED | OUTPATIENT
Start: 2019-04-24 | End: 2019-04-24

## 2019-04-24 RX ADMIN — ONDANSETRON 4 MILLIGRAM(S): 8 TABLET, FILM COATED ORAL at 08:20

## 2019-04-24 RX ADMIN — OXYCODONE HYDROCHLORIDE 5 MILLIGRAM(S): 5 TABLET ORAL at 08:20

## 2019-04-24 RX ADMIN — CELECOXIB 200 MILLIGRAM(S): 200 CAPSULE ORAL at 06:13

## 2019-04-24 RX ADMIN — Medication 1000 MILLIGRAM(S): at 06:12

## 2019-04-24 RX ADMIN — APREPITANT 40 MILLIGRAM(S): 80 CAPSULE ORAL at 06:13

## 2019-04-24 RX ADMIN — CELECOXIB 200 MILLIGRAM(S): 200 CAPSULE ORAL at 08:20

## 2019-04-24 NOTE — ASU DISCHARGE PLAN (ADULT/PEDIATRIC) - ASU DC SPECIAL INSTRUCTIONSFT
WOUND CARE:  Please leave the Steri-strips in place. They will fall off on their own. Please keep incisions clean and dry. Please do not Scrub or rub incisions. Do not use lotion or powder on incisions.   BATHING: Please do not submerge wound underwater. You may shower and/or sponge bathe.  ACTIVITY: No heavy lifting or straining until your follow up appointment. Otherwise, you may return to your usual level of physical activity. If you are taking narcotic pain medication (such as Percocet) DO NOT drive a car, operate machinery or make important decisions.  DIET: Return to your usual diet.  NOTIFY YOUR SURGEON IF: You have any bleeding that does not stop, any pus draining from your wound(s), any fever (over 100.4 F) or chills, persistent nausea/vomiting, persistent diarrhea, or if your pain is not controlled on your discharge pain medications.  FOLLOW-UP: Please follow-up with your surgeon, in 1 week following discharge-please call to schedule an appointment.

## 2019-04-24 NOTE — BRIEF OPERATIVE NOTE - OPERATION/FINDINGS
Incision made in the 5'oclock position of the R breast after localizing with externally with the RUTH . The clip was localized using the Ruth  and wide excision of tissue was performed. The clip was confirmed to be in the excised tissue using RUTH  and on imaging. Excision edges were approximated with 3-0 Vicryl. Excision was closed with 4-0 Monocryl sutures.

## 2019-04-24 NOTE — ASU DISCHARGE PLAN (ADULT/PEDIATRIC) - CALL YOUR DOCTOR IF YOU HAVE ANY OF THE FOLLOWING:
Fever greater than (need to indicate Fahrenheit or Celsius)/Bleeding that does not stop/Swelling that gets worse/Pain not relieved by Medications

## 2019-04-24 NOTE — ASU DISCHARGE PLAN (ADULT/PEDIATRIC) - FOLLOW UP APPOINTMENTS
[facility] ([facility]): 911 or go to the nearest Emergency Room

## 2019-04-24 NOTE — ASU PATIENT PROFILE, ADULT - PMH
Chronic sciatica  - Right side  History of fibromuscular dysplasia  s/p Additional Bilateral Renal Artery Catheterization via Aorta in 1980s - last doppler  - no evidence of FMD as per Dr. SUZETTE Kenny (nephrology) 02/2019  HTN (hypertension)    Hyperlipidemia    Intraductal carcinoma in situ  Right breast, 01/2019  Sinusitis

## 2019-04-24 NOTE — ASU DISCHARGE PLAN (ADULT/PEDIATRIC) - CARE PROVIDER_API CALL
Demetrius Mendes)  Surgery  [address], [address]  [place] [address]  Phone: [phone]  Fax: [phone]  Follow Up Time:

## 2019-04-25 ENCOUNTER — MEDICATION RENEWAL (OUTPATIENT)
Age: 79
End: 2019-04-25

## 2019-04-26 PROBLEM — J32.9 CHRONIC SINUSITIS, UNSPECIFIED: Chronic | Status: ACTIVE | Noted: 2019-04-18

## 2019-04-26 PROBLEM — I10 ESSENTIAL (PRIMARY) HYPERTENSION: Chronic | Status: ACTIVE | Noted: 2019-04-18

## 2019-04-26 PROBLEM — E78.5 HYPERLIPIDEMIA, UNSPECIFIED: Chronic | Status: ACTIVE | Noted: 2019-04-18

## 2019-05-02 ENCOUNTER — APPOINTMENT (OUTPATIENT)
Dept: SURGERY | Facility: CLINIC | Age: 79
End: 2019-05-02
Payer: MEDICARE

## 2019-05-02 VITALS
RESPIRATION RATE: 15 BRPM | DIASTOLIC BLOOD PRESSURE: 63 MMHG | HEART RATE: 85 BPM | SYSTOLIC BLOOD PRESSURE: 113 MMHG | OXYGEN SATURATION: 98 % | TEMPERATURE: 98.2 F

## 2019-05-02 LAB — SURGICAL PATHOLOGY STUDY: SIGNIFICANT CHANGE UP

## 2019-05-02 PROCEDURE — 99024 POSTOP FOLLOW-UP VISIT: CPT

## 2019-05-02 NOTE — ASSESSMENT
[FreeTextEntry1] : The pathology results were given to the patient. I have told her that as the margin is close his the anterior margin was right under the skin is nothing more that I can do to clear that area. I suggested that she see a medical oncologist to discuss any hormonal manipulation..

## 2019-05-02 NOTE — REASON FOR VISIT
[Post Op: _________] : a [unfilled] post op visit [FreeTextEntry1] : Ductal carcinoma in situ, right breast, partial mastectomy

## 2019-05-02 NOTE — HISTORY OF PRESENT ILLNESS
[de-identified] : KATHRYN AMEZCUA is 78 year old female seen for a post op visit. S/P Ductal carninoma in situ, right breast. Partial mastectomy on 04/24/19  She has no complaints.

## 2019-05-07 ENCOUNTER — APPOINTMENT (OUTPATIENT)
Dept: SURGERY | Facility: CLINIC | Age: 79
End: 2019-05-07

## 2019-08-26 ENCOUNTER — APPOINTMENT (OUTPATIENT)
Dept: HEMATOLOGY ONCOLOGY | Facility: CLINIC | Age: 79
End: 2019-08-26

## 2019-10-07 ENCOUNTER — OUTPATIENT (OUTPATIENT)
Dept: OUTPATIENT SERVICES | Facility: HOSPITAL | Age: 79
LOS: 1 days | Discharge: HOME | End: 2019-10-07

## 2019-10-07 DIAGNOSIS — D05.90 UNSPECIFIED TYPE OF CARCINOMA IN SITU OF UNSPECIFIED BREAST: ICD-10-CM

## 2019-10-08 ENCOUNTER — APPOINTMENT (OUTPATIENT)
Dept: HEMATOLOGY ONCOLOGY | Facility: CLINIC | Age: 79
End: 2019-10-08
Payer: MEDICARE

## 2019-10-08 VITALS
DIASTOLIC BLOOD PRESSURE: 75 MMHG | HEART RATE: 85 BPM | HEIGHT: 60 IN | OXYGEN SATURATION: 97 % | BODY MASS INDEX: 29.45 KG/M2 | SYSTOLIC BLOOD PRESSURE: 124 MMHG | WEIGHT: 150 LBS | RESPIRATION RATE: 16 BRPM | TEMPERATURE: 97.8 F

## 2019-10-08 DIAGNOSIS — Z80.3 FAMILY HISTORY OF MALIGNANT NEOPLASM OF BREAST: ICD-10-CM

## 2019-10-08 PROCEDURE — 99205 OFFICE O/P NEW HI 60 MIN: CPT

## 2019-10-14 PROBLEM — Z80.3 FAMILY HISTORY OF BREAST CANCER: Status: ACTIVE | Noted: 2019-10-14

## 2019-10-14 NOTE — HISTORY OF PRESENT ILLNESS
[Disease: _____________________] : Disease: [unfilled] [T: ___] : T[unfilled] [N: ___] : N[unfilled] [M: ___] : M[unfilled] [AJCC Stage: ____] : AJCC Stage: [unfilled] [de-identified] : DCIS, int nuclear grade, ER+AL+ [de-identified] : 80yo woman presents for initial breast medical oncology consultation.\par \par Patient had screening mammogram on 01/24/19 that showed a mass in the lower inner quadrant of the right breast; BiRADS: 0. Follow-up mammo on 01/29/19 showed a 10 mm irregular focal asymmetry of the posterior lower central right breast. US of the right breast on 01/29/19 showed no sonographic evidence of malignancy. 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 ER>95%, CT >95%\par \par Right partial mastectomy on 04/24/19 Dr. Demetrius Mendes: DCIS intermediate grade, 1.3cm, <1mm margin anterior, TisNx\par \par 5/2/19 last seen by Dr. Mendes - per documentation told "margin is close his the anterior margin was right under the skin is nothing more that I can do to clear that area." Referred to medical oncology at that time.\par \par She has no current complaints and is otherwise doing well. She has healed well post-operatively.\par \par Pertinent Medical History:\par Fibromuscular dysplasia s/p unilateral angioplasty - Nephrology Dr. Kenny, renal function stable\par HTN, HLD\par Osteopenia DEXA 2013\par Anxiety\par Sciatica\par Cscope 4/2019 OK\par PMD Dr. Perez\par Her sister had breast cancer, another sister with leukemia, father had bladder cancer.\par LMP age 44. 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

## 2019-10-14 NOTE — PHYSICAL EXAM
[Fully active, able to carry on all pre-disease performance without restriction] : Status 0 - Fully active, able to carry on all pre-disease performance without restriction [Normal] : affect appropriate [de-identified] : R lumpectomy well healed, no palpable masses or adenopathy b/l

## 2019-10-14 NOTE — ASSESSMENT
[FreeTextEntry1] : \par We discussed the diagnosis of DCIS as a non-invasive cancer that portends an increased risk of developing invasive breast cancer in the future, and that biopsy and subsequent surgery has completely removed the known area of involvement (based on surgical pathology report/review). Her DCIS expresses both the estrogen receptor and the progesterone receptor (ER+ OH+), indicating potential benefit for endocrine therapy after surgery.\par \par We reviewed the option for medical treatment of ER+/OH+ DCIS with anastrozole, which is a pill taken daily by mouth for 5 years as tolerated in order to reduce the risk of developing an invasive breast cancer recurrence ipsilaterally or a new invasive breast cancer bilaterally in the future. We reviewed the side effects of anastrozole (an aromatase inhibitor) including but not limited to hot flashes, vaginal dryness or discharge, hair thinning, bone loss (which can lead to osteoporosis and fracture risk), potential cardiovascular risk, and joint pains. She will have a baseline DEXA scan to assess bone density, and she was instructed to begin calcium (1200mg PO daily) and vitamin D supplementation (1000mg PO daily) for bone strength. She was counseled on the importance of exercise 30 minutes per day, 5 days per week. All of the patient’s questions were answered and she is in agreement with this plan. I will eprescribe anastrozole and she will begin today. She will follow up with me in 3 months, and call sooner if there are any issues or concerns, symptoms on anastrozole. She will follow up with her breast surgeon as planned for interval exam, imaging, and surveillance. She was provided with written information regarding her diagnosis and planned therapy, potential side effects.\par \par

## 2019-10-22 ENCOUNTER — APPOINTMENT (OUTPATIENT)
Dept: SURGERY | Facility: CLINIC | Age: 79
End: 2019-10-22
Payer: MEDICARE

## 2019-10-22 VITALS
BODY MASS INDEX: 29.45 KG/M2 | OXYGEN SATURATION: 98 % | RESPIRATION RATE: 16 BRPM | DIASTOLIC BLOOD PRESSURE: 82 MMHG | HEIGHT: 60 IN | HEART RATE: 80 BPM | WEIGHT: 150 LBS | SYSTOLIC BLOOD PRESSURE: 146 MMHG | TEMPERATURE: 97.9 F

## 2019-10-22 PROCEDURE — 99215 OFFICE O/P EST HI 40 MIN: CPT

## 2019-10-22 NOTE — PHYSICAL EXAM
[de-identified] : Examination of both breasts fail to reveal any dominant masses the nipple areolar complex is normal. There is no nipple discharge. Both axilla are negative for any adenopathy.

## 2019-10-22 NOTE — HISTORY OF PRESENT ILLNESS
[de-identified] : Charo is a 80 y/o female here for follow up visit s/p ductal carcinoma in situ, right breast on 04/24/19.  The patient the present time is on anastrozole. She does breast self examination and is noted no masses in her breasts and no nipple discharge.

## 2019-10-22 NOTE — ASSESSMENT
[FreeTextEntry1] : I given the patient a prescription for a mammogram as it is about 6 months since her surgery to document the surgical condition of the patient's right breast. The patient is being seen by her surgical oncologist every 3 months and I told the patient that I see no reason for her to come back to see me as long as she is being followed by the oncologist every 3 months.

## 2019-10-30 ENCOUNTER — TRANSCRIPTION ENCOUNTER (OUTPATIENT)
Age: 79
End: 2019-10-30

## 2019-11-04 ENCOUNTER — APPOINTMENT (OUTPATIENT)
Dept: OBGYN | Facility: CLINIC | Age: 79
End: 2019-11-04

## 2019-11-12 ENCOUNTER — OUTPATIENT (OUTPATIENT)
Dept: OUTPATIENT SERVICES | Facility: HOSPITAL | Age: 79
LOS: 1 days | End: 2019-11-12
Payer: MEDICARE

## 2019-11-12 ENCOUNTER — APPOINTMENT (OUTPATIENT)
Dept: ULTRASOUND IMAGING | Facility: CLINIC | Age: 79
End: 2019-11-12
Payer: MEDICARE

## 2019-11-12 DIAGNOSIS — R10.13 EPIGASTRIC PAIN: ICD-10-CM

## 2019-11-12 PROCEDURE — 76700 US EXAM ABDOM COMPLETE: CPT

## 2019-11-12 PROCEDURE — 76700 US EXAM ABDOM COMPLETE: CPT | Mod: 26

## 2019-11-14 ENCOUNTER — OUTPATIENT (OUTPATIENT)
Dept: OUTPATIENT SERVICES | Facility: HOSPITAL | Age: 79
LOS: 1 days | End: 2019-11-14
Payer: MEDICARE

## 2019-11-14 ENCOUNTER — APPOINTMENT (OUTPATIENT)
Dept: RADIOLOGY | Facility: HOSPITAL | Age: 79
End: 2019-11-14
Payer: MEDICARE

## 2019-11-14 DIAGNOSIS — K44.9 DIAPHRAGMATIC HERNIA WITHOUT OBSTRUCTION OR GANGRENE: ICD-10-CM

## 2019-11-14 PROCEDURE — 74241: CPT

## 2019-11-14 PROCEDURE — 74241: CPT | Mod: 26

## 2019-11-18 ENCOUNTER — FORM ENCOUNTER (OUTPATIENT)
Age: 79
End: 2019-11-18

## 2019-11-19 ENCOUNTER — APPOINTMENT (OUTPATIENT)
Dept: MAMMOGRAPHY | Facility: IMAGING CENTER | Age: 79
End: 2019-11-19
Payer: MEDICARE

## 2019-11-19 ENCOUNTER — OUTPATIENT (OUTPATIENT)
Dept: OUTPATIENT SERVICES | Facility: HOSPITAL | Age: 79
LOS: 1 days | End: 2019-11-19
Payer: MEDICARE

## 2019-11-19 ENCOUNTER — RESULT REVIEW (OUTPATIENT)
Age: 79
End: 2019-11-19

## 2019-11-19 DIAGNOSIS — D05.10 INTRADUCTAL CARCINOMA IN SITU OF UNSPECIFIED BREAST: ICD-10-CM

## 2019-11-19 PROCEDURE — G0279: CPT

## 2019-11-19 PROCEDURE — 77065 DX MAMMO INCL CAD UNI: CPT | Mod: 26,RT

## 2019-11-19 PROCEDURE — 77065 DX MAMMO INCL CAD UNI: CPT

## 2019-11-19 PROCEDURE — G0279: CPT | Mod: 26

## 2019-11-20 ENCOUNTER — APPOINTMENT (OUTPATIENT)
Dept: SURGERY | Facility: CLINIC | Age: 79
End: 2019-11-20
Payer: MEDICARE

## 2019-11-20 VITALS
BODY MASS INDEX: 29.45 KG/M2 | OXYGEN SATURATION: 99 % | HEART RATE: 74 BPM | DIASTOLIC BLOOD PRESSURE: 82 MMHG | WEIGHT: 150 LBS | HEIGHT: 60 IN | SYSTOLIC BLOOD PRESSURE: 129 MMHG | TEMPERATURE: 97.8 F

## 2019-11-20 PROCEDURE — 99203 OFFICE O/P NEW LOW 30 MIN: CPT

## 2019-12-02 ENCOUNTER — NON-APPOINTMENT (OUTPATIENT)
Age: 79
End: 2019-12-02

## 2019-12-02 ENCOUNTER — APPOINTMENT (OUTPATIENT)
Dept: INTERNAL MEDICINE | Facility: CLINIC | Age: 79
End: 2019-12-02
Payer: MEDICARE

## 2019-12-02 VITALS
TEMPERATURE: 98.5 F | SYSTOLIC BLOOD PRESSURE: 132 MMHG | HEART RATE: 76 BPM | DIASTOLIC BLOOD PRESSURE: 72 MMHG | BODY MASS INDEX: 29.45 KG/M2 | HEIGHT: 60 IN | OXYGEN SATURATION: 98 % | WEIGHT: 150 LBS

## 2019-12-02 DIAGNOSIS — M62.442: ICD-10-CM

## 2019-12-02 DIAGNOSIS — Z86.69 PERSONAL HISTORY OF OTHER DISEASES OF THE NERVOUS SYSTEM AND SENSE ORGANS: ICD-10-CM

## 2019-12-02 DIAGNOSIS — M54.31 SCIATICA, RIGHT SIDE: ICD-10-CM

## 2019-12-02 LAB
ANION GAP SERPL CALC-SCNC: 11 MMOL/L
BASOPHILS # BLD AUTO: 0.1 K/UL
BASOPHILS NFR BLD AUTO: 1.9 %
BUN SERPL-MCNC: 15 MG/DL
CALCIUM SERPL-MCNC: 10.4 MG/DL
CHLORIDE SERPL-SCNC: 99 MMOL/L
CO2 SERPL-SCNC: 29 MMOL/L
CREAT SERPL-MCNC: 0.9 MG/DL
EOSINOPHIL # BLD AUTO: 0.08 K/UL
EOSINOPHIL NFR BLD AUTO: 1.6 %
GLUCOSE SERPL-MCNC: 99 MG/DL
HCT VFR BLD CALC: 44.5 %
HGB BLD-MCNC: 14.3 G/DL
IMM GRANULOCYTES NFR BLD AUTO: 0.2 %
LYMPHOCYTES # BLD AUTO: 1.93 K/UL
LYMPHOCYTES NFR BLD AUTO: 37.4 %
MAN DIFF?: NORMAL
MCHC RBC-ENTMCNC: 30.6 PG
MCHC RBC-ENTMCNC: 32.1 GM/DL
MCV RBC AUTO: 95.1 FL
MONOCYTES # BLD AUTO: 0.46 K/UL
MONOCYTES NFR BLD AUTO: 8.9 %
NEUTROPHILS # BLD AUTO: 2.58 K/UL
NEUTROPHILS NFR BLD AUTO: 50 %
PLATELET # BLD AUTO: 266 K/UL
POTASSIUM SERPL-SCNC: 4.6 MMOL/L
RBC # BLD: 4.68 M/UL
RBC # FLD: 12.3 %
SODIUM SERPL-SCNC: 139 MMOL/L
WBC # FLD AUTO: 5.16 K/UL

## 2019-12-02 PROCEDURE — 99214 OFFICE O/P EST MOD 30 MIN: CPT | Mod: 25

## 2019-12-02 PROCEDURE — 93000 ELECTROCARDIOGRAM COMPLETE: CPT

## 2019-12-02 NOTE — REVIEW OF SYSTEMS
[Negative] : Neurological [Fever] : no fever [Chills] : no chills [Fatigue] : no fatigue [Night Sweats] : no night sweats [Recent Change In Weight] : ~T no recent weight change [Chest Pain] : no chest pain [Palpitations] : no palpitations [Leg Claudication] : no leg claudication [Lower Ext Edema] : no lower extremity edema [Orthopnea] : no orthopnea [Paroxysmal Nocturnal Dyspnea] : no paroxysmal nocturnal dyspnea [Wheezing] : no wheezing [Shortness Of Breath] : no shortness of breath [Nausea] : no nausea [Cough] : no cough [Constipation] : no constipation [Diarrhea] : diarrhea [Vomiting] : no vomiting [FreeTextEntry7] : abdominal discomfort, particularly with eating

## 2019-12-02 NOTE — HISTORY OF PRESENT ILLNESS
[No Pertinent Cardiac History] : no history of aortic stenosis, atrial fibrillation, coronary artery disease, recent myocardial infarction, or implantable device/pacemaker [No Pertinent Pulmonary History] : no history of asthma, COPD, sleep apnea, or smoking [No Adverse Anesthesia Reaction] : no adverse anesthesia reaction in self or family member [(Patient denies any chest pain, claudication, dyspnea on exertion, orthopnea, palpitations or syncope)] : Patient denies any chest pain, claudication, dyspnea on exertion, orthopnea, palpitations or syncope [Chronic Anticoagulation] : no chronic anticoagulation [Diabetes] : no diabetes [FreeTextEntry1] : paresophageal hernia repair [FreeTextEntry2] : 12/17/19 [FreeTextEntry3] : Dr. Justo Goodman [FreeTextEntry7] : no prior cardiac testing [FreeTextEntry4] : Ms. Chavez is  a 77 y/o female with a hx of HTN, b/l fibromuscular dysplasia here for preoperative evaluation prior to hiatal hernia repair on 12/17/19.  \par Reports early satiety and upper abdominal discomfort with eating, but has otherwise been feeling well.  No regular exercise d/t discomfort from hernia.  Can walk up 2 flights up stairs without difficulty.  Denies chest pain, palpitations, dyspnea.  \par Fax preop to [phone].

## 2019-12-02 NOTE — RESULTS/DATA
[] : results reviewed [ECG Reviewed] : reviewed [No Acute Ischemia] : No acute ischemia [NSR] : normal sinus rhythm [No Interval Change] : no interval change

## 2019-12-02 NOTE — ASSESSMENT
[High Risk Surgery - Intraperitoneal, Intrathoracic or Supringuinal Vascular Procedures] : High Risk Surgery - Intraperitoneal, Intrathoracic or Supringuinal Vascular Procedures - No (0) [Ischemic Heart Disease] : Ischemic Heart Disease - No (0) [Congestive Heart Failure] : Congestive Heart Failure - No (0) [Prior Cerebrovascular Accident or TIA] : Prior Cerebrovascular Accident or TIA - No (0) [Creatinine >= 2mg/dL (1 Point)] : Creatinine >= 2mg/dL - No (0) [Insulin-dependent Diabetic (1 Point)] : Insulin-dependent Diabetic - No (0) [0] : 0 , RCRI Class: I, Risk of Post-Op Cardiac Complications: 0.4%, Procedure Risk: Low-Risk [Patient Optimized for Surgery] : Patient optimized for surgery [Continue medications as is] : Continue current medications [As per surgery] : as per surgery [FreeTextEntry4] : Patient is acceptable risk for the planned moderate risk procedure.  Patient has no active cardiac conditions and acceptable METs (>4).  EKG was reviewed and without ischemic changes. CBC and BMP are within normal limits.    \par Patient may proceed with planned surgery without further workup. [FreeTextEntry7] : Continue daily antihypertensives, advised to hold supplements until after procedure.

## 2019-12-02 NOTE — PHYSICAL EXAM
[No Acute Distress] : no acute distress [Normal Sclera/Conjunctiva] : normal sclera/conjunctiva [Supple] : supple [No Respiratory Distress] : no respiratory distress  [Normal Rate] : normal rate  [Clear to Auscultation] : lungs were clear to auscultation bilaterally [Regular Rhythm] : with a regular rhythm [Normal S1, S2] : normal S1 and S2 [No Murmur] : no murmur heard [No Edema] : there was no peripheral edema [Non Tender] : non-tender [Soft] : abdomen soft [Well Developed] : well developed [No Lymphadenopathy] : no lymphadenopathy [No Accessory Muscle Use] : no accessory muscle use [No Carotid Bruits] : no carotid bruits [No CVA Tenderness] : no CVA  tenderness

## 2019-12-11 ENCOUNTER — TRANSCRIPTION ENCOUNTER (OUTPATIENT)
Age: 79
End: 2019-12-11

## 2019-12-23 ENCOUNTER — TRANSCRIPTION ENCOUNTER (OUTPATIENT)
Age: 79
End: 2019-12-23

## 2020-01-06 ENCOUNTER — APPOINTMENT (OUTPATIENT)
Dept: NEPHROLOGY | Facility: CLINIC | Age: 80
End: 2020-01-06
Payer: MEDICARE

## 2020-01-06 VITALS — HEART RATE: 100 BPM | DIASTOLIC BLOOD PRESSURE: 57 MMHG | SYSTOLIC BLOOD PRESSURE: 118 MMHG

## 2020-01-06 VITALS — SYSTOLIC BLOOD PRESSURE: 130 MMHG | DIASTOLIC BLOOD PRESSURE: 78 MMHG

## 2020-01-06 VITALS — SYSTOLIC BLOOD PRESSURE: 106 MMHG | DIASTOLIC BLOOD PRESSURE: 60 MMHG

## 2020-01-06 VITALS — WEIGHT: 149 LBS | HEART RATE: 100 BPM | OXYGEN SATURATION: 98 % | BODY MASS INDEX: 29.1 KG/M2

## 2020-01-06 VITALS — DIASTOLIC BLOOD PRESSURE: 60 MMHG | SYSTOLIC BLOOD PRESSURE: 90 MMHG

## 2020-01-06 VITALS — SYSTOLIC BLOOD PRESSURE: 114 MMHG | DIASTOLIC BLOOD PRESSURE: 70 MMHG

## 2020-01-06 PROCEDURE — 99214 OFFICE O/P EST MOD 30 MIN: CPT

## 2020-01-06 NOTE — PHYSICAL EXAM
[General Appearance - In No Acute Distress] : in no acute distress [General Appearance - Well Nourished] : well nourished [General Appearance - Alert] : alert [Examination Of The Oral Cavity] : the lips and gums were normal [Oropharynx] : the oropharynx was normal [Outer Ear] : the ears and nose were normal in appearance [Neck Cervical Mass (___cm)] : no neck mass was observed [Jugular Venous Distention Increased] : there was no jugular-venous distention [Neck Appearance] : the appearance of the neck was normal [Thyroid Nodule] : there were no palpable thyroid nodules [Auscultation Breath Sounds / Voice Sounds] : lungs were clear to auscultation bilaterally [Apical Impulse] : the apical impulse was normal [Heart Sounds] : normal S1 and S2 [Murmurs] : no murmurs [Edema] : there was no peripheral edema [Abdomen Soft] : soft [Bowel Sounds] : normal bowel sounds [Abdomen Tenderness] : non-tender [FreeTextEntry1] : not examined [Cervical Lymph Nodes Enlarged Posterior Bilaterally] : posterior cervical [Supraclavicular Lymph Nodes Enlarged Bilaterally] : supraclavicular [No CVA Tenderness] : no ~M costovertebral angle tenderness [Abnormal Walk] : normal gait [Involuntary Movements] : no involuntary movements were seen [Skin Color & Pigmentation] : normal skin color and pigmentation [Skin Turgor] : normal skin turgor [] : no rash [Motor Exam] : the motor exam was normal [Oriented To Time, Place, And Person] : oriented to person, place, and time [Impaired Insight] : insight and judgment were intact [Affect] : the affect was normal

## 2020-01-06 NOTE — HISTORY OF PRESENT ILLNESS
[FreeTextEntry1] : Today I had the pleasure of meeting Charo Chavez.  She is a 78 years old woman with an extraordinary medical and personal history.  Although she is [place] she spent the early part of her life with her  and young son traveling and backpacking across the entire world.  She has a history of bilateral fibromuscular dysplasia and is s/p unilateral angioplasty.  Her blood pressure has been well controlled over the years and recently she's been doing well on hctz 25 and losartan 100.  Of late her blood pressure has been rising.  She reports that she just returned from her trip to [place] and she had a great time there.  Since returning she changed her diet around a lot and has lost weight.  Last night she went out to eat with her daughter's boss and had an extraordinarily salty meal.\par \par 2/1/19 -- Since our last meeting Charo has had quite a few positive things in her personal / professional life.  She has been asked to help author a book, she has been asked to speak.  Overall doing quite well.  She had an abnormal mammogram recently and so she has to go and get a breast biopsy.  Her blood pressure has been slightly on the higher side, still.  A repeat doppler done after last visit did not show any evidence of FMD.\par \par 1/6/2020 -- Since our last visit Mikhail had a breast exiscion.  She also had a problem with a paraesophageal hernia.  She had it repaired about three weeks ago and has subsequently been on a liquid diet.  She's had a couple of episodes of dysphagia which caused her to regurgitate.  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

## 2020-01-06 NOTE — REVIEW OF SYSTEMS
[Chills] : no chills [Fever] : no fever [Feeling Poorly] : feeling poorly [Eyesight Problems] : no eyesight problems [Nosebleeds] : no nosebleeds [Feeling Tired] : not feeling tired [Lower Ext Edema] : no lower extremity edema [Chest Pain] : no chest pain [Shortness Of Breath] : no shortness of breath [Wheezing] : no wheezing [Cough] : no cough [SOB on Exertion] : no shortness of breath during exertion [Dysuria] : no dysuria [Arthralgias] : no arthralgias [Incontinence] : no incontinence [Joint Pain] : no joint pain [Itching] : no itching [As Noted in HPI] : as noted in HPI [Anxiety] : no anxiety [Depression] : no depression [Fainting] : no fainting [Easy Bleeding] : no tendency for easy bleeding [Easy Bruising] : no tendency for easy bruising

## 2020-01-06 NOTE — ASSESSMENT
[FreeTextEntry1] : Ms. Chavez is a 79 years old woman with FMD who presents today for follow up.\par \par Hypertension -- Secondary to fibromuscular dysplasia.  The patient's blood pressure presently low and although she is tachycardic.  I suspect that she is somewhat hypovolemic.  Her BP did drop somewhat when she stood up although she was asymptomatic.  I asked her to go outside, eat a bag of potato chips with water.  She came back in and repeated her BP which was improved -- she was still asymptomatic.  I discouraged her from driving if she becomes dizzy or lightheaded.  She will stop HCTZ for now and she will call me wednesday to report in on her blood pressure.

## 2020-01-07 LAB
ALBUMIN SERPL ELPH-MCNC: 4.2 G/DL
ANION GAP SERPL CALC-SCNC: 14 MMOL/L
BASOPHILS # BLD AUTO: 0.06 K/UL
BASOPHILS NFR BLD AUTO: 1 %
BUN SERPL-MCNC: 21 MG/DL
CALCIUM SERPL-MCNC: 10 MG/DL
CHLORIDE SERPL-SCNC: 100 MMOL/L
CO2 SERPL-SCNC: 27 MMOL/L
CREAT SERPL-MCNC: 0.98 MG/DL
EOSINOPHIL # BLD AUTO: 0.11 K/UL
EOSINOPHIL NFR BLD AUTO: 1.8 %
FERRITIN SERPL-MCNC: 186 NG/ML
GLUCOSE SERPL-MCNC: 96 MG/DL
HCT VFR BLD CALC: 41.6 %
HGB BLD-MCNC: 13.8 G/DL
IMM GRANULOCYTES NFR BLD AUTO: 0.5 %
IRON SATN MFR SERPL: 27 %
IRON SERPL-MCNC: 88 UG/DL
LYMPHOCYTES # BLD AUTO: 2.09 K/UL
LYMPHOCYTES NFR BLD AUTO: 33.9 %
MAN DIFF?: NORMAL
MCHC RBC-ENTMCNC: 30.7 PG
MCHC RBC-ENTMCNC: 33.2 GM/DL
MCV RBC AUTO: 92.7 FL
MONOCYTES # BLD AUTO: 0.61 K/UL
MONOCYTES NFR BLD AUTO: 9.9 %
NEUTROPHILS # BLD AUTO: 3.27 K/UL
NEUTROPHILS NFR BLD AUTO: 52.9 %
PHOSPHATE SERPL-MCNC: 2.2 MG/DL
PLATELET # BLD AUTO: 354 K/UL
POTASSIUM SERPL-SCNC: 3.3 MMOL/L
RBC # BLD: 4.49 M/UL
RBC # FLD: 12.1 %
SODIUM SERPL-SCNC: 141 MMOL/L
TIBC SERPL-MCNC: 330 UG/DL
UIBC SERPL-MCNC: 242 UG/DL
WBC # FLD AUTO: 6.17 K/UL

## 2020-01-09 ENCOUNTER — MEDICATION RENEWAL (OUTPATIENT)
Age: 80
End: 2020-01-09

## 2020-01-15 ENCOUNTER — RX RENEWAL (OUTPATIENT)
Age: 80
End: 2020-01-15

## 2020-01-23 ENCOUNTER — OUTPATIENT (OUTPATIENT)
Dept: OUTPATIENT SERVICES | Facility: HOSPITAL | Age: 80
LOS: 1 days | Discharge: HOME | End: 2020-01-23

## 2020-01-23 DIAGNOSIS — D05.90 UNSPECIFIED TYPE OF CARCINOMA IN SITU OF UNSPECIFIED BREAST: ICD-10-CM

## 2020-01-27 ENCOUNTER — APPOINTMENT (OUTPATIENT)
Dept: MAMMOGRAPHY | Facility: IMAGING CENTER | Age: 80
End: 2020-01-27

## 2020-01-27 ENCOUNTER — APPOINTMENT (OUTPATIENT)
Dept: ULTRASOUND IMAGING | Facility: IMAGING CENTER | Age: 80
End: 2020-01-27

## 2020-01-30 ENCOUNTER — APPOINTMENT (OUTPATIENT)
Dept: HEMATOLOGY ONCOLOGY | Facility: CLINIC | Age: 80
End: 2020-01-30
Payer: MEDICARE

## 2020-01-30 VITALS
DIASTOLIC BLOOD PRESSURE: 82 MMHG | WEIGHT: 148 LBS | SYSTOLIC BLOOD PRESSURE: 153 MMHG | BODY MASS INDEX: 28.9 KG/M2 | HEART RATE: 75 BPM | RESPIRATION RATE: 14 BRPM | TEMPERATURE: 97.9 F | OXYGEN SATURATION: 100 %

## 2020-01-30 PROCEDURE — 99213 OFFICE O/P EST LOW 20 MIN: CPT

## 2020-01-30 NOTE — HISTORY OF PRESENT ILLNESS
[T: ___] : T[unfilled] [Disease: _____________________] : Disease: [unfilled] [AJCC Stage: ____] : AJCC Stage: [unfilled] [N: ___] : N[unfilled] [M: ___] : M[unfilled] [de-identified] : 78yo woman presents for breast medical oncology follow up.\par \par Patient had screening mammogram on 01/24/19 that showed a mass in the lower inner quadrant of the right breast; BiRADS: 0. Follow-up mammo on 01/29/19 showed a 10 mm irregular focal asymmetry of the posterior lower central right breast. US of the right breast on 01/29/19 showed no sonographic evidence of malignancy. 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 ER>95%, [place] >95%\par \par Right partial mastectomy on 04/24/19 Dr. Demetrius Mendes: DCIS intermediate grade, 1.3cm, <1mm margin anterior, TisNx\par \par 5/2/19 last seen by Dr. Mendes - per documentation told "margin is close his the anterior margin was right under the skin is nothing more that I can do to clear that area." Referred to medical oncology at that time.\par \par Started on anastrozole.\par \par Pertinent Medical History:\par Fibromuscular dysplasia s/p unilateral angioplasty - Nephrology Dr. Kenny, renal function stable\par HTN, HLD\par Osteopenia DEXA 2013\par Anxiety\par Sciatica\par Cscope 4/2019 OK\par PMD Dr. Perez\par Her sister had breast cancer, another sister with leukemia, father had bladder cancer.\par LMP age 44. 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 [de-identified] : DCIS, int nuclear grade, ER+FL+ [de-identified] : Patient presents for interval follow up on anastrozole. She is doing well and without AEs, tolerating therapy. No active complaints or issues. Recent hiatal hernia repair laparoscopically - healing. Saw her breast surgeon in recent months, no issues. She notes some weight loss intentional with dietary changes and exercise. Recent change in lipid medication and BP meds with nephrologist and new PMD.

## 2020-01-30 NOTE — ASSESSMENT
[FreeTextEntry1] : 80yo woman with ER+RI+ DCIS s/p lumpectomy, on AI presenting for breast medical oncology follow up.\par \par -continue anastrozole daily\par -DEXA 2019 with osteopenia; continue calcium (1200mg PO daily) and vitamin D supplementation (1000mg PO daily) for bone strength\par -continue follow up with breast surgeon as planned for interval exam, imaging, and surveillance; 2/2020 annual mammo due (R mammo 11/2019 BIRADS 2) - she will call surgeon to schedule and follow up now\par -HTN management and follow up with Dr. Kenny nephrology advised ASAP\par -continue all RHM: PMD with lipid checks at least every 6 months (recent), dermatology annually, ophthalmologist annually, gynecologist annually (recent PAP noted ok), colon cancer screening with PMD\par -follow up with me in 4 months, sooner if issues\par

## 2020-01-30 NOTE — PHYSICAL EXAM
[Fully active, able to carry on all pre-disease performance without restriction] : Status 0 - Fully active, able to carry on all pre-disease performance without restriction [Normal] : grossly intact [de-identified] : R lumpectomy well healed, no palpable masses or adenopathy b/l

## 2020-03-02 ENCOUNTER — FORM ENCOUNTER (OUTPATIENT)
Age: 80
End: 2020-03-02

## 2020-03-03 ENCOUNTER — APPOINTMENT (OUTPATIENT)
Dept: MAMMOGRAPHY | Facility: CLINIC | Age: 80
End: 2020-03-03
Payer: MEDICARE

## 2020-03-03 ENCOUNTER — OUTPATIENT (OUTPATIENT)
Dept: OUTPATIENT SERVICES | Facility: HOSPITAL | Age: 80
LOS: 1 days | End: 2020-03-03
Payer: MEDICARE

## 2020-03-03 ENCOUNTER — APPOINTMENT (OUTPATIENT)
Dept: ULTRASOUND IMAGING | Facility: CLINIC | Age: 80
End: 2020-03-03
Payer: MEDICARE

## 2020-03-03 DIAGNOSIS — D05.10 INTRADUCTAL CARCINOMA IN SITU OF UNSPECIFIED BREAST: ICD-10-CM

## 2020-03-03 PROCEDURE — 76641 ULTRASOUND BREAST COMPLETE: CPT

## 2020-03-03 PROCEDURE — 77066 DX MAMMO INCL CAD BI: CPT | Mod: 26

## 2020-03-03 PROCEDURE — 76641 ULTRASOUND BREAST COMPLETE: CPT | Mod: 26,50

## 2020-03-03 PROCEDURE — G0279: CPT | Mod: 26

## 2020-03-03 PROCEDURE — G0279: CPT

## 2020-03-03 PROCEDURE — 77066 DX MAMMO INCL CAD BI: CPT

## 2020-04-05 NOTE — ASU PREOP CHECKLIST - IDENTIFICATION BAND VERIFIED
Called by RN pt  at 7:40 AM.   84 y/o M PMH HTN, COPD on 2L NC, CVA X2 (, ) w/ left hemiplegia, slurred speech p/w hypoxic respiratory failure likely viral PNA 2/2 COVID-19. Per discussion w/ family decided [facility], pt was on morphine drip.  devon Rodriguez informed [phone] as well as Dr [place] [id number]
spoke  to devon Rodriguez [phone] updated w/ pt's poor condition, pt is on MSo4 gtt, all questions answered.    Imelda Castillo [facility] [place] [id number]
done

## 2020-05-07 ENCOUNTER — RX RENEWAL (OUTPATIENT)
Age: 80
End: 2020-05-07

## 2020-07-23 ENCOUNTER — RX RENEWAL (OUTPATIENT)
Age: 80
End: 2020-07-23

## 2020-09-13 ENCOUNTER — OUTPATIENT (OUTPATIENT)
Dept: OUTPATIENT SERVICES | Facility: HOSPITAL | Age: 80
LOS: 1 days | Discharge: HOME | End: 2020-09-13

## 2020-09-13 DIAGNOSIS — D05.90 UNSPECIFIED TYPE OF CARCINOMA IN SITU OF UNSPECIFIED BREAST: ICD-10-CM

## 2020-09-17 ENCOUNTER — APPOINTMENT (OUTPATIENT)
Dept: HEMATOLOGY ONCOLOGY | Facility: CLINIC | Age: 80
End: 2020-09-17
Payer: MEDICARE

## 2020-09-17 VITALS
WEIGHT: 155.4 LBS | HEART RATE: 69 BPM | OXYGEN SATURATION: 99 % | TEMPERATURE: 98.2 F | RESPIRATION RATE: 16 BRPM | BODY MASS INDEX: 30.51 KG/M2 | HEIGHT: 60 IN | DIASTOLIC BLOOD PRESSURE: 85 MMHG | SYSTOLIC BLOOD PRESSURE: 162 MMHG

## 2020-09-17 PROCEDURE — 99213 OFFICE O/P EST LOW 20 MIN: CPT

## 2020-09-21 ENCOUNTER — APPOINTMENT (OUTPATIENT)
Dept: RHEUMATOLOGY | Facility: CLINIC | Age: 80
End: 2020-09-21
Payer: MEDICARE

## 2020-09-21 ENCOUNTER — LABORATORY RESULT (OUTPATIENT)
Age: 80
End: 2020-09-21

## 2020-09-21 VITALS
WEIGHT: 152 LBS | HEIGHT: 60 IN | BODY MASS INDEX: 29.84 KG/M2 | DIASTOLIC BLOOD PRESSURE: 78 MMHG | OXYGEN SATURATION: 98 % | HEART RATE: 76 BPM | TEMPERATURE: 98.2 F | SYSTOLIC BLOOD PRESSURE: 161 MMHG

## 2020-09-21 DIAGNOSIS — T50.905D ADVERSE EFFECT OF UNSPECIFIED DRUGS, MEDICAMENTS AND BIOLOGICAL SUBSTANCES, SUBSEQUENT ENCOUNTER: ICD-10-CM

## 2020-09-21 DIAGNOSIS — M79.641 PAIN IN RIGHT HAND: ICD-10-CM

## 2020-09-21 DIAGNOSIS — M79.642 PAIN IN RIGHT HAND: ICD-10-CM

## 2020-09-21 PROCEDURE — 99203 OFFICE O/P NEW LOW 30 MIN: CPT

## 2020-09-22 PROBLEM — M79.641 BILATERAL HAND PAIN: Status: ACTIVE | Noted: 2020-09-22

## 2020-09-22 PROBLEM — T50.905D MEDICATION SIDE EFFECT, SUBSEQUENT ENCOUNTER: Status: ACTIVE | Noted: 2020-09-22

## 2020-09-22 LAB
ALBUMIN SERPL ELPH-MCNC: 4.7 G/DL
ALP BLD-CCNC: 90 U/L
ALT SERPL-CCNC: 29 U/L
ANION GAP SERPL CALC-SCNC: 13 MMOL/L
APPEARANCE: CLEAR
AST SERPL-CCNC: 30 U/L
BASOPHILS # BLD AUTO: 0.07 K/UL
BASOPHILS NFR BLD AUTO: 1.2 %
BILIRUB SERPL-MCNC: 0.6 MG/DL
BILIRUBIN URINE: NEGATIVE
BLOOD URINE: NEGATIVE
BUN SERPL-MCNC: 21 MG/DL
C3 SERPL-MCNC: 114 MG/DL
C4 SERPL-MCNC: 26 MG/DL
CALCIUM SERPL-MCNC: 10.3 MG/DL
CHLORIDE SERPL-SCNC: 97 MMOL/L
CO2 SERPL-SCNC: 28 MMOL/L
COLOR: YELLOW
CREAT SERPL-MCNC: 0.83 MG/DL
CREAT SPEC-SCNC: 89 MG/DL
CREAT/PROT UR: 0.1 RATIO
CRP SERPL-MCNC: 0.11 MG/DL
EOSINOPHIL # BLD AUTO: 0.04 K/UL
EOSINOPHIL NFR BLD AUTO: 0.7 %
ERYTHROCYTE [SEDIMENTATION RATE] IN BLOOD BY WESTERGREN METHOD: 8 MM/HR
GLUCOSE QUALITATIVE U: NEGATIVE
GLUCOSE SERPL-MCNC: 105 MG/DL
HCT VFR BLD CALC: 42.4 %
HGB BLD-MCNC: 13.7 G/DL
IMM GRANULOCYTES NFR BLD AUTO: 0.2 %
KETONES URINE: NEGATIVE
LEUKOCYTE ESTERASE URINE: ABNORMAL
LYMPHOCYTES # BLD AUTO: 2.26 K/UL
LYMPHOCYTES NFR BLD AUTO: 37.8 %
MAN DIFF?: NORMAL
MCHC RBC-ENTMCNC: 30.6 PG
MCHC RBC-ENTMCNC: 32.3 GM/DL
MCV RBC AUTO: 94.6 FL
MONOCYTES # BLD AUTO: 0.41 K/UL
MONOCYTES NFR BLD AUTO: 6.9 %
NEUTROPHILS # BLD AUTO: 3.19 K/UL
NEUTROPHILS NFR BLD AUTO: 53.2 %
NITRITE URINE: NEGATIVE
PH URINE: 6.5
PLATELET # BLD AUTO: 214 K/UL
POTASSIUM SERPL-SCNC: 3.7 MMOL/L
PROT SERPL-MCNC: 6.9 G/DL
PROT UR-MCNC: 8 MG/DL
PROTEIN URINE: NEGATIVE
RBC # BLD: 4.48 M/UL
RBC # FLD: 12.2 %
RHEUMATOID FACT SER QL: <10 IU/ML
SODIUM SERPL-SCNC: 138 MMOL/L
SPECIFIC GRAVITY URINE: 1.02
UROBILINOGEN URINE: NORMAL
WBC # FLD AUTO: 5.98 K/UL

## 2020-09-23 LAB
CCP AB SER IA-ACNC: <8 UNITS
DSDNA AB SER-ACNC: <12 IU/ML
HISTONE AB SER QL: 1 UNITS
RF+CCP IGG SER-IMP: NEGATIVE

## 2020-09-25 LAB
ANA SER IF-ACNC: NEGATIVE
G6PD SER-CCNC: 14.9 U/G HGB

## 2020-09-28 ENCOUNTER — APPOINTMENT (OUTPATIENT)
Dept: RADIOLOGY | Facility: CLINIC | Age: 80
End: 2020-09-28
Payer: MEDICARE

## 2020-09-28 ENCOUNTER — OUTPATIENT (OUTPATIENT)
Dept: OUTPATIENT SERVICES | Facility: HOSPITAL | Age: 80
LOS: 1 days | End: 2020-09-28
Payer: MEDICARE

## 2020-09-28 DIAGNOSIS — M85.80 OTHER SPECIFIED DISORDERS OF BONE DENSITY AND STRUCTURE, UNSPECIFIED SITE: ICD-10-CM

## 2020-09-28 PROCEDURE — 73120 X-RAY EXAM OF HAND: CPT | Mod: 26,50

## 2020-09-28 PROCEDURE — 73120 X-RAY EXAM OF HAND: CPT

## 2020-09-28 RX ORDER — ANASTROZOLE TABLETS 1 MG/1
1 TABLET ORAL
Qty: 30 | Refills: 5 | Status: DISCONTINUED | COMMUNITY
Start: 2019-10-08 | End: 2020-09-28

## 2020-10-05 ENCOUNTER — APPOINTMENT (OUTPATIENT)
Dept: INTERNAL MEDICINE | Facility: CLINIC | Age: 80
End: 2020-10-05
Payer: MEDICARE

## 2020-10-05 VITALS
HEIGHT: 60 IN | OXYGEN SATURATION: 99 % | DIASTOLIC BLOOD PRESSURE: 84 MMHG | TEMPERATURE: 98 F | WEIGHT: 152 LBS | BODY MASS INDEX: 29.84 KG/M2 | HEART RATE: 75 BPM | RESPIRATION RATE: 16 BRPM | SYSTOLIC BLOOD PRESSURE: 152 MMHG

## 2020-10-05 DIAGNOSIS — R73.03 PREDIABETES.: ICD-10-CM

## 2020-10-05 DIAGNOSIS — R42 DIZZINESS AND GIDDINESS: ICD-10-CM

## 2020-10-05 DIAGNOSIS — Z78.9 OTHER SPECIFIED HEALTH STATUS: ICD-10-CM

## 2020-10-05 DIAGNOSIS — Z86.39 PERSONAL HISTORY OF OTHER ENDOCRINE, NUTRITIONAL AND METABOLIC DISEASE: ICD-10-CM

## 2020-10-05 DIAGNOSIS — Z82.49 FAMILY HISTORY OF ISCHEMIC HEART DISEASE AND OTHER DISEASES OF THE CIRCULATORY SYSTEM: ICD-10-CM

## 2020-10-05 DIAGNOSIS — Z87.898 PERSONAL HISTORY OF OTHER SPECIFIED CONDITIONS: ICD-10-CM

## 2020-10-05 DIAGNOSIS — Z87.09 PERSONAL HISTORY OF OTHER DISEASES OF THE RESPIRATORY SYSTEM: ICD-10-CM

## 2020-10-05 DIAGNOSIS — M19.90 UNSPECIFIED OSTEOARTHRITIS, UNSPECIFIED SITE: ICD-10-CM

## 2020-10-05 DIAGNOSIS — Z98.890 OTHER SPECIFIED POSTPROCEDURAL STATES: ICD-10-CM

## 2020-10-05 PROCEDURE — 99214 OFFICE O/P EST MOD 30 MIN: CPT

## 2020-10-05 RX ORDER — OMEPRAZOLE 40 MG/1
40 CAPSULE, DELAYED RELEASE ORAL
Qty: 90 | Refills: 0 | Status: DISCONTINUED | COMMUNITY
Start: 2020-05-21 | End: 2020-10-05

## 2020-10-05 RX ORDER — BIOTIN 5 MG
5 CAPSULE ORAL
Refills: 0 | Status: DISCONTINUED | COMMUNITY
End: 2020-10-05

## 2020-10-05 RX ORDER — ANASTROZOLE 1 MG/1
1 TABLET ORAL
Refills: 0 | Status: DISCONTINUED | COMMUNITY
End: 2020-10-05

## 2020-10-06 ENCOUNTER — RX RENEWAL (OUTPATIENT)
Age: 80
End: 2020-10-06

## 2020-10-06 LAB
CHOLEST SERPL-MCNC: 201 MG/DL
CHOLEST/HDLC SERPL: 2.7 RATIO
ESTIMATED AVERAGE GLUCOSE: 114 MG/DL
HBA1C MFR BLD HPLC: 5.6 %
HDLC SERPL-MCNC: 75 MG/DL
LDLC SERPL CALC-MCNC: 101 MG/DL
TRIGL SERPL-MCNC: 122 MG/DL

## 2020-10-12 ENCOUNTER — APPOINTMENT (OUTPATIENT)
Dept: NEPHROLOGY | Facility: CLINIC | Age: 80
End: 2020-10-12
Payer: MEDICARE

## 2020-10-12 PROCEDURE — 99442: CPT | Mod: 95

## 2020-11-12 ENCOUNTER — APPOINTMENT (OUTPATIENT)
Dept: INTERNAL MEDICINE | Facility: CLINIC | Age: 80
End: 2020-11-12

## 2020-12-10 ENCOUNTER — APPOINTMENT (OUTPATIENT)
Dept: NEPHROLOGY | Facility: CLINIC | Age: 80
End: 2020-12-10
Payer: MEDICARE

## 2020-12-10 VITALS
SYSTOLIC BLOOD PRESSURE: 155 MMHG | OXYGEN SATURATION: 98 % | TEMPERATURE: 97.6 F | HEART RATE: 77 BPM | DIASTOLIC BLOOD PRESSURE: 80 MMHG | BODY MASS INDEX: 29.49 KG/M2 | WEIGHT: 151 LBS

## 2020-12-10 PROCEDURE — 99214 OFFICE O/P EST MOD 30 MIN: CPT

## 2020-12-10 NOTE — REVIEW OF SYSTEMS
[Fever] : no fever [Chills] : no chills [Feeling Poorly] : not feeling poorly [Feeling Tired] : not feeling tired [Eyesight Problems] : no eyesight problems [Nosebleeds] : no nosebleeds [Chest Pain] : no chest pain [Lower Ext Edema] : no lower extremity edema [Shortness Of Breath] : no shortness of breath [Wheezing] : no wheezing [Cough] : no cough [SOB on Exertion] : no shortness of breath during exertion [Abdominal Pain] : no abdominal pain [Vomiting] : no vomiting [Dysuria] : no dysuria [Incontinence] : no incontinence [Arthralgias] : no arthralgias [Joint Pain] : no joint pain [Itching] : no itching [As Noted in HPI] : as noted in HPI [Dizziness] : no dizziness [Fainting] : no fainting [Anxiety] : anxiety [Depression] : no depression [Easy Bleeding] : no tendency for easy bleeding [Easy Bruising] : no tendency for easy bruising

## 2020-12-10 NOTE — ASSESSMENT
[FreeTextEntry1] : Ms. Chavez is a 80 years old woman with FMD who presents today for follow up.\par \par Hypertension -- Secondary to fibromuscular dysplasia.  The patient's blood pressure presently elevated.  Earlier this year we did a trial of lifestyle modification however this does not seem to be working particularly well for her blood pressure.  We spoke about the risk / benefit of trying new medications.  I am going to start by switching HCTZ to chlorthalidone to see if she gets better BP control.  Counseled on the adverse effects.  She will try it for two weeks and call me with her bp readings.  will also repeat renal doppler\par \par f/u in six months if bp is better on chlorthalidone.

## 2020-12-10 NOTE — HISTORY OF PRESENT ILLNESS
[FreeTextEntry1] : Today I had the pleasure of meeting Charo Chavez.  She is a 78 years old woman with an extraordinary medical and personal history.  Although she is [place] she spent the early part of her life with her  and young son traveling and backpacking across the entire world.  She has a history of bilateral fibromuscular dysplasia and is s/p unilateral angioplasty.  Her blood pressure has been well controlled over the years and recently she's been doing well on hctz 25 and losartan 100.  Of late her blood pressure has been rising.  She reports that she just returned from her trip to [place] and she had a great time there.  Since returning she changed her diet around a lot and has lost weight.  Last night she went out to eat with her daughter's boss and had an extraordinarily salty meal.\par \par 2/1/19 -- Since our last meeting Charo has had quite a few positive things in her personal / professional life.  She has been asked to help author a book, she has been asked to speak.  Overall doing quite well.  She had an abnormal mammogram recently and so she has to go and get a breast biopsy.  Her blood pressure has been slightly on the higher side, still.  A repeat doppler done after last visit did not show any evidence of FMD.\par \par 1/6/2020 -- Since our last visit Mikhail had a breast exiscion.  She also had a problem with a paraesophageal hernia.  She had it repaired about three weeks ago and has subsequently been on a liquid diet.  She's had a couple of episodes of dysphagia which caused her to regurgitate.  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  She feels less active during the pandemic.  Her blood pressure has been elevated consistently.  Otherwise denies complaints today.

## 2020-12-21 RX ORDER — CHLORTHALIDONE 25 MG/1
25 TABLET ORAL DAILY
Qty: 14 | Refills: 0 | Status: DISCONTINUED | COMMUNITY
Start: 2020-12-10 | End: 2020-12-21

## 2020-12-29 ENCOUNTER — OUTPATIENT (OUTPATIENT)
Dept: OUTPATIENT SERVICES | Facility: HOSPITAL | Age: 80
LOS: 1 days | Discharge: HOME | End: 2020-12-29

## 2020-12-29 DIAGNOSIS — D05.90 UNSPECIFIED TYPE OF CARCINOMA IN SITU OF UNSPECIFIED BREAST: ICD-10-CM

## 2021-01-05 ENCOUNTER — APPOINTMENT (OUTPATIENT)
Dept: HEMATOLOGY ONCOLOGY | Facility: CLINIC | Age: 81
End: 2021-01-05
Payer: MEDICARE

## 2021-01-05 VITALS
SYSTOLIC BLOOD PRESSURE: 123 MMHG | DIASTOLIC BLOOD PRESSURE: 74 MMHG | HEIGHT: 60 IN | HEART RATE: 73 BPM | TEMPERATURE: 97.3 F | RESPIRATION RATE: 16 BRPM | OXYGEN SATURATION: 98 % | WEIGHT: 151.99 LBS | BODY MASS INDEX: 29.84 KG/M2

## 2021-01-05 PROCEDURE — 99214 OFFICE O/P EST MOD 30 MIN: CPT

## 2021-02-19 ENCOUNTER — NON-APPOINTMENT (OUTPATIENT)
Age: 81
End: 2021-02-19

## 2021-02-23 ENCOUNTER — TRANSCRIPTION ENCOUNTER (OUTPATIENT)
Age: 81
End: 2021-02-23

## 2021-03-01 NOTE — ASU PATIENT PROFILE, ADULT - TEACHING/LEARNING FACTORS IMPACT ABILITY TO LEARN
Patient identified as a positive fall risk on the ED triage fall screening. Patient placed in fall precautions which includes:  yellow fall risk bracelet on wrist,  \"Be Safe\" sign placed on patient's door, and bed alarm placed under patient/alarm turned on. Patient instructed on importance of not getting out of bed or ambulating without assistance for safety. none

## 2021-03-09 ENCOUNTER — APPOINTMENT (OUTPATIENT)
Dept: MAMMOGRAPHY | Facility: IMAGING CENTER | Age: 81
End: 2021-03-09
Payer: MEDICARE

## 2021-03-09 ENCOUNTER — RESULT REVIEW (OUTPATIENT)
Age: 81
End: 2021-03-09

## 2021-03-09 ENCOUNTER — OUTPATIENT (OUTPATIENT)
Dept: OUTPATIENT SERVICES | Facility: HOSPITAL | Age: 81
LOS: 1 days | End: 2021-03-09
Payer: MEDICARE

## 2021-03-09 ENCOUNTER — APPOINTMENT (OUTPATIENT)
Dept: ULTRASOUND IMAGING | Facility: IMAGING CENTER | Age: 81
End: 2021-03-09
Payer: MEDICARE

## 2021-03-09 DIAGNOSIS — Z00.8 ENCOUNTER FOR OTHER GENERAL EXAMINATION: ICD-10-CM

## 2021-03-09 PROCEDURE — G0279: CPT | Mod: 26

## 2021-03-09 PROCEDURE — 77066 DX MAMMO INCL CAD BI: CPT | Mod: 26

## 2021-03-09 PROCEDURE — 76641 ULTRASOUND BREAST COMPLETE: CPT

## 2021-03-09 PROCEDURE — G0279: CPT

## 2021-03-09 PROCEDURE — 77066 DX MAMMO INCL CAD BI: CPT

## 2021-03-09 PROCEDURE — 76641 ULTRASOUND BREAST COMPLETE: CPT | Mod: 26,50

## 2021-04-01 ENCOUNTER — APPOINTMENT (OUTPATIENT)
Dept: ORTHOPEDIC SURGERY | Facility: CLINIC | Age: 81
End: 2021-04-01
Payer: MEDICARE

## 2021-04-01 VITALS
HEART RATE: 75 BPM | HEIGHT: 65 IN | SYSTOLIC BLOOD PRESSURE: 133 MMHG | BODY MASS INDEX: 25.16 KG/M2 | WEIGHT: 151 LBS | DIASTOLIC BLOOD PRESSURE: 73 MMHG

## 2021-04-01 DIAGNOSIS — Z86.79 PERSONAL HISTORY OF OTHER DISEASES OF THE CIRCULATORY SYSTEM: ICD-10-CM

## 2021-04-01 DIAGNOSIS — Z85.9 PERSONAL HISTORY OF MALIGNANT NEOPLASM, UNSPECIFIED: ICD-10-CM

## 2021-04-01 DIAGNOSIS — Z86.39 PERSONAL HISTORY OF OTHER ENDOCRINE, NUTRITIONAL AND METABOLIC DISEASE: ICD-10-CM

## 2021-04-01 PROCEDURE — 99214 OFFICE O/P EST MOD 30 MIN: CPT

## 2021-04-01 PROCEDURE — 72100 X-RAY EXAM L-S SPINE 2/3 VWS: CPT

## 2021-04-01 PROCEDURE — 99204 OFFICE O/P NEW MOD 45 MIN: CPT

## 2021-04-01 PROCEDURE — 72170 X-RAY EXAM OF PELVIS: CPT

## 2021-04-01 NOTE — ADDENDUM
[FreeTextEntry1] : I, Thomas Nguyen, acted solely as a scribe for Dr. Jean Moreno on this date 04/01/2021.\par All medical record entries made by the Scribe were at my, Dr. Jean Moreno, direction and personally dictated by me on 04/01/2021. I have reviewed the chart and agree that the record accurately reflects my personal performance of the history, physical exam, assessment and plan. I have also personally directed, reviewed, and agreed with the chart.

## 2021-04-01 NOTE — HISTORY OF PRESENT ILLNESS
[de-identified] : 80 year old female presents complaining of right-sided low back pain that started at least 6 months ago. She denies any injury or trauma. She notes that her pain is intermittent but has worsened recently. It radiates into her right buttock but never into her legs. She denies numbness and tingling. After walking for any length of time, her hips feel tight and she has discomfort on the lateral aspects. She denies groin pain. She does not have pain when sleeping at night. She notes a history of HTN and does not take Advil or Aspirin as they raise her blood pressure. She is on Amlodipine and Losartan.

## 2021-04-01 NOTE — PHYSICAL EXAM
[de-identified] : Constitutional\par o Appearance : well-nourished, well developed, alert, in no acute distress \par Head and Face\par o Head :\par ¦ Inspection : atraumatic, normocephalic\par o Face :\par ¦ Inspection : no visible rash or discoloration\par Respiratory\par o Respiratory Effort: breathing unlabored \par Neurologic\par o Mental Status Examination :\par ¦ Orientation : alert and oriented X 3\par Psychiatric\par o Mood and Affect: mood normal, affect appropriate\par Cardiovascular\par o Observation/Palpation : - no swelling\par Lymphatic\par o Additional Nodes : No palpable lymph nodes present\par \par Lumbosacral Spine\par o Inspection : no visible rash or discoloration\par o Palpation : paraspinal musculature nontender, no sciatic notch or SI joint tenderness\par o Range of Motion : full and painless arc of motion in all planes, extension rotation and sidebending do not cause discomfort\par o Muscle Strength : paraspinal muscle strength and tone within normal limits\par o Muscle Tone : paraspinal muscle strength and tone within normal limits\par o Tests: straight leg test negative and ARY test negative bilaterally\par  \par Right Lower Extremity\par o Buttock : no tenderness, swelling or deformities\par o Right Hip :\par ¦ Inspection/Palpation : no greater trochanteric tenderness, no swelling or deformities\par ¦ Range of Motion : full and painless in all planes, no crepitance\par ¦ Stability : joint stability intact\par ¦ Strength : extension, flexion, adduction, abduction, internal rotation and external rotation, 3+/5 \par \par Left Lower Extremity\par o Buttock : no tenderness, swelling or deformities \par o Left Hip :\par ¦ Inspection/Palpation : no greater trochanteric tenderness, no swelling or deformities\par ¦ Range of Motion : full and painless in all planes, no crepitance\par ¦ Stability : joint stability intact\par ¦ Strength : extension, flexion, adduction, abduction, internal rotation and external rotation, 3+/5\par  \par Gait: slightly kyphotic gait, no significant extremity swelling or lymphedema, no foot drop, reasonably good core strength but sitting-up causes right buttock pain\par \par Radiology Results \par o Lumbosacral Spine : AP and lateral views were obtained and revealed grade 1 spondylolisthesis of L4 on L5, diffuse facet arthropathy, degenerative disc disease worse at L1/L2, calcification of the aorta.\par o Pelvis: AP pelvis and BOTH hips was obtained and revealed mild degenerative arthritis of both hips, mild degenerative arthritis of both SI joints, no lytic lesions.

## 2021-04-01 NOTE — DISCUSSION/SUMMARY
[de-identified] : I discussed the underlying pathophysiology of the patient's condition in great detail with the patient. I went over the patient's x-rays with them in great detail. The extent of the patient’s arthritis was discussed in great detail with them. We discussed the use of ice, Tylenol and anti-inflammatories to relieve pain. A prescription for Celebrex was provided. At-home strengthening, and stretching exercises were discussed and demonstrated with the patient. We went over the wide ranging benefits of exercise for the patient health and I encouraged her to begin a diligent exercise program. She should focus on light weight and high repetition exercises. She should avoid any heavy lifting, carrying, arching and twisting. At this time, she will start a course of physical therapy for strengthening and flexibility. A prescription for physical therapy was provided. If she does not improve after 1 month of PT then an MRI of her back may be considered. All of her questions were answered. She understands and consents to the plan. \par \par 1 month.\par after undergoing PT for her lower back and legs.\par after taking Celebrex.

## 2021-04-16 ENCOUNTER — RX RENEWAL (OUTPATIENT)
Age: 81
End: 2021-04-16

## 2021-04-21 ENCOUNTER — NON-APPOINTMENT (OUTPATIENT)
Age: 81
End: 2021-04-21

## 2021-04-21 ENCOUNTER — APPOINTMENT (OUTPATIENT)
Dept: INTERNAL MEDICINE | Facility: CLINIC | Age: 81
End: 2021-04-21
Payer: MEDICARE

## 2021-04-21 VITALS
TEMPERATURE: 97.4 F | DIASTOLIC BLOOD PRESSURE: 81 MMHG | WEIGHT: 153 LBS | HEART RATE: 81 BPM | BODY MASS INDEX: 25.49 KG/M2 | RESPIRATION RATE: 16 BRPM | HEIGHT: 65 IN | OXYGEN SATURATION: 99 % | SYSTOLIC BLOOD PRESSURE: 118 MMHG

## 2021-04-21 PROCEDURE — G0439: CPT

## 2021-04-21 RX ORDER — EXEMESTANE 25 MG/1
25 TABLET, FILM COATED ORAL DAILY
Qty: 30 | Refills: 4 | Status: DISCONTINUED | COMMUNITY
Start: 2020-09-17 | End: 2021-04-21

## 2021-04-29 LAB
25(OH)D3 SERPL-MCNC: 64.9 NG/ML
ALBUMIN SERPL ELPH-MCNC: 4.7 G/DL
ALP BLD-CCNC: 92 U/L
ALT SERPL-CCNC: 22 U/L
ANION GAP SERPL CALC-SCNC: 13 MMOL/L
AST SERPL-CCNC: 24 U/L
BASOPHILS # BLD AUTO: 0.07 K/UL
BASOPHILS NFR BLD AUTO: 1.1 %
BILIRUB SERPL-MCNC: 0.8 MG/DL
BUN SERPL-MCNC: 23 MG/DL
CALCIUM SERPL-MCNC: 10.5 MG/DL
CHLORIDE SERPL-SCNC: 99 MMOL/L
CHOLEST SERPL-MCNC: 235 MG/DL
CO2 SERPL-SCNC: 30 MMOL/L
CREAT SERPL-MCNC: 1 MG/DL
EOSINOPHIL # BLD AUTO: 0.06 K/UL
EOSINOPHIL NFR BLD AUTO: 1 %
ESTIMATED AVERAGE GLUCOSE: 111 MG/DL
GLUCOSE SERPL-MCNC: 104 MG/DL
HBA1C MFR BLD HPLC: 5.5 %
HCT VFR BLD CALC: 41.3 %
HDLC SERPL-MCNC: 86 MG/DL
HGB BLD-MCNC: 13.5 G/DL
IMM GRANULOCYTES NFR BLD AUTO: 0.3 %
LDLC SERPL CALC-MCNC: 130 MG/DL
LYMPHOCYTES # BLD AUTO: 2.44 K/UL
LYMPHOCYTES NFR BLD AUTO: 38.9 %
MAN DIFF?: NORMAL
MCHC RBC-ENTMCNC: 30.8 PG
MCHC RBC-ENTMCNC: 32.7 GM/DL
MCV RBC AUTO: 94.1 FL
MONOCYTES # BLD AUTO: 0.54 K/UL
MONOCYTES NFR BLD AUTO: 8.6 %
NEUTROPHILS # BLD AUTO: 3.14 K/UL
NEUTROPHILS NFR BLD AUTO: 50.1 %
NONHDLC SERPL-MCNC: 149 MG/DL
PLATELET # BLD AUTO: 245 K/UL
POTASSIUM SERPL-SCNC: 3.6 MMOL/L
PROT SERPL-MCNC: 7.1 G/DL
RBC # BLD: 4.39 M/UL
RBC # FLD: 12.7 %
SODIUM SERPL-SCNC: 141 MMOL/L
TRIGL SERPL-MCNC: 97 MG/DL
TSH SERPL-ACNC: 2.1 UIU/ML
WBC # FLD AUTO: 6.27 K/UL

## 2021-04-30 ENCOUNTER — NON-APPOINTMENT (OUTPATIENT)
Age: 81
End: 2021-04-30

## 2021-05-03 ENCOUNTER — NON-APPOINTMENT (OUTPATIENT)
Age: 81
End: 2021-05-03

## 2021-05-03 ENCOUNTER — APPOINTMENT (OUTPATIENT)
Dept: ORTHOPEDIC SURGERY | Facility: CLINIC | Age: 81
End: 2021-05-03
Payer: MEDICARE

## 2021-05-03 DIAGNOSIS — M16.0 BILATERAL PRIMARY OSTEOARTHRITIS OF HIP: ICD-10-CM

## 2021-05-03 PROCEDURE — 73130 X-RAY EXAM OF HAND: CPT | Mod: 50

## 2021-05-03 PROCEDURE — 99214 OFFICE O/P EST MOD 30 MIN: CPT

## 2021-05-03 NOTE — HISTORY OF PRESENT ILLNESS
[de-identified] : 80 year old female presents with right-sided low back and bilateral lateral hip pain. It radiates into her right buttock but never into her legs. She denies numbness and tingling. She denies groin pain. She does not have pain when sleeping at night. She has been going to PT and reports that her hips are doing better. She is now able to walk for 10 minutes without pain. She understands the importance of weight loss, and has lost 2 pounds. She would like to continue with PT.\par She also complains of bilateral hand pain that started about 2 years ago. 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 She returned to her physician who suggested she switch to a different medication. That medication also caused these symptoms but worse. She notes that she has not been taking any of these medications for quite a while and her hands have continued to bother her. She is concerned as she cannot make a fist and her strength is significantly diminished. She still has burning and stiffness in both hands. Of note, She notes a history of HTN and does not take Advil or Aspirin as they raise her blood pressure. She is on Amlodipine and Losartan. \par \par Radiology Results taken on 4/1/2021:\par o Lumbosacral Spine : AP and lateral views were obtained and revealed grade 1 spondylolisthesis of L4 on L5, diffuse facet arthropathy, degenerative disc disease worse at L1/L2, calcification of the aorta.\par o Pelvis: AP pelvis and BOTH hips was obtained and revealed mild degenerative arthritis of both hips, mild degenerative arthritis of both SI joints, no lytic lesions.

## 2021-05-03 NOTE — ADDENDUM
[FreeTextEntry1] : I, Thomas Nguyen, acted solely as a scribe for Dr. Jean Moreno on this date 05/03/2021.\par All medical record entries made by the Scribe were at my, Dr. Jean Moreno, direction and personally dictated by me on 05/03/2021. I have reviewed the chart and agree that the record accurately reflects my personal performance of the history, physical exam, assessment and plan. I have also personally directed, reviewed, and agreed with the chart.

## 2021-05-03 NOTE — DISCUSSION/SUMMARY
[de-identified] : I discussed the underlying pathophysiology of the patient's condition in great detail with the patient. I went over the patient's x-rays with them in great detail. We discussed the use of ice, Tylenol and anti-inflammatories to relieve pain. I stressed the importance of continued weight loss and its benefits to the patient’s joints and overall health. I am recommending the patient continue going to physical therapy for her hips and back to obtain increases in their strength and mobility. A prescription for PT was provided. At this time, she will start a course of occupational therapy for both hands. A prescription for OT was provided. If her hands do not improve EMG and nerve conduction studies could be considered.. All of her questions were answered. She understands and consents to the plan. \par \par FU 1 month.\par after continuing PT for her low back and hips.\par after undergoing OT for her hands.

## 2021-05-03 NOTE — PHYSICAL EXAM
[de-identified] : Constitutional\par o Appearance : well-nourished, well developed, alert, in no acute distress \par Head and Face\par o Head :\par ¦ Inspection : atraumatic, normocephalic\par o Face :\par ¦ Inspection : no visible rash or discoloration\par Respiratory\par o Respiratory Effort: breathing unlabored \par Neurologic\par o Mental Status Examination :\par ¦ Orientation : alert and oriented X 3\par Psychiatric\par o Mood and Affect: mood normal, affect appropriate\par Cardiovascular\par o Observation/Palpation : - no swelling\par Lymphatic\par o Additional Nodes : No palpable lymph nodes present\par \par Right Upper Extremity\par o Right Wrist:\par ¦ Inspection/Palpation : no tenderness, swelling or deformities\par ¦ Range of Motion : full and painless in all planes, no crepitance\par ¦ Strength : extension, flexion, ulnar deviation and radial deviation 5/5\par ¦ Stability : no joint instability on provocative testing\par ¦ Tests/Signs : Tinel's sign negative over carpal tunnel, negative Finkelstein, negative Phalen's test, negative grind test\par \par o Right Hand :\par ¦ Inspection/Palpation : no tenderness to palpation or pain with axial compression, deformity at the PIP joints of the pinky, small Heberden's nodes at the DIP of the index or long fingers\par ¦ Range of Motion : limited motion at the MCP joint with pain right hand worse than left\par ¦ Strength : limited  strength\par ¦ Stability : no joint instability on provocative testing\par o Sensation : sensation intact to light touch\par o Skin : no skin lesions or discoloration \par \par Left Upper Extremity \par o Left Wrist:\par ¦ Inspection/Palpation : no tenderness, no swelling, hypertrophic change of the basal joint\par ¦ Range of Motion : full and painless in all planes, no crepitance\par ¦ Strength : extension, flexion, ulnar deviation and radial deviation 5/5\par ¦ Stability : no joint instability on provocative testing\par ¦ Tests/Signs : Tinel's sign negative over carpal tunnel, negative Finkelstein, negative Phalen's test, negative grind test\par \par o Left Hand :\par ¦ Inspection/Palpation : no tenderness to palpation or pain with axial compression, deformity at the PIP joints of the pinky, small Heberden's nodes at the DIP of the index or long fingers\par ¦ Range of Motion : limited motion at the MCP joint with pain right hand worse than left\par ¦ Strength : limited  strength\par ¦ Stability : no joint instability on provocative testing \par o Sensation : sensation intact to light touch\par o Skin : no skin lesions or discoloration\par \par Lumbosacral Spine\par o Inspection : no visible rash or discoloration\par o Palpation : paraspinal musculature nontender, no sciatic notch or SI joint tenderness\par o Range of Motion : full and painless arc of motion in all planes, no discomfort with extension, sidebending or rotation \par o Muscle Strength : paraspinal muscle strength and tone within normal limits\par o Muscle Tone : paraspinal muscle strength and tone within normal limits\par o Tests: straight leg test negative and ARY test negative bilaterally\par  \par Right Lower Extremity\par o Buttock : no tenderness, swelling or deformities\par o Right Hip :\par ¦ Inspection/Palpation : no greater trochanteric tenderness, no swelling or deformities\par ¦ Range of Motion : full and painless in all planes, no crepitance\par ¦ Stability : joint stability intact\par ¦ Strength : extension, flexion, adduction, abduction, internal rotation and external rotation, 3+/5 \par \par Left Lower Extremity\par o Buttock : no tenderness, swelling or deformities \par o Left Hip :\par ¦ Inspection/Palpation : no greater trochanteric tenderness, no swelling or deformities\par ¦ Range of Motion : full and painless in all planes, no crepitance\par ¦ Stability : joint stability intact\par ¦ Strength : extension, flexion, adduction, abduction, internal rotation and external rotation, 3+/5\par  \par Gait: slightly kyphotic gait, no significant extremity swelling or lymphedema, no foot drop, reasonably good core strength but sitting-up causes right buttock pain\par \par Radiology Results:\par o Right Hand: AP, lateral and oblique views were obtained and revealed basal joint arthritis of the thumb with severe arthritis of the DIP joint of the 5th finger.\par o Left Hand: AP, lateral and oblique views were obtained and revealed basal joint arthritis of the thumb with severe arthritis of the DIP joint of the 5th finger.\par

## 2021-05-06 ENCOUNTER — TRANSCRIPTION ENCOUNTER (OUTPATIENT)
Age: 81
End: 2021-05-06

## 2021-05-10 ENCOUNTER — TRANSCRIPTION ENCOUNTER (OUTPATIENT)
Age: 81
End: 2021-05-10

## 2021-05-22 ENCOUNTER — OUTPATIENT (OUTPATIENT)
Dept: OUTPATIENT SERVICES | Facility: HOSPITAL | Age: 81
LOS: 1 days | End: 2021-05-22
Payer: MEDICARE

## 2021-05-22 ENCOUNTER — APPOINTMENT (OUTPATIENT)
Dept: MRI IMAGING | Facility: IMAGING CENTER | Age: 81
End: 2021-05-22
Payer: MEDICARE

## 2021-05-22 DIAGNOSIS — M47.816 SPONDYLOSIS WITHOUT MYELOPATHY OR RADICULOPATHY, LUMBAR REGION: ICD-10-CM

## 2021-05-22 PROCEDURE — 72148 MRI LUMBAR SPINE W/O DYE: CPT | Mod: 26,ME

## 2021-05-22 PROCEDURE — G1004: CPT

## 2021-05-22 PROCEDURE — 72148 MRI LUMBAR SPINE W/O DYE: CPT

## 2021-05-24 ENCOUNTER — OUTPATIENT (OUTPATIENT)
Dept: OUTPATIENT SERVICES | Facility: HOSPITAL | Age: 81
LOS: 1 days | Discharge: HOME | End: 2021-05-24

## 2021-05-24 DIAGNOSIS — D05.90 UNSPECIFIED TYPE OF CARCINOMA IN SITU OF UNSPECIFIED BREAST: ICD-10-CM

## 2021-05-25 ENCOUNTER — NON-APPOINTMENT (OUTPATIENT)
Age: 81
End: 2021-05-25

## 2021-05-25 ENCOUNTER — APPOINTMENT (OUTPATIENT)
Dept: HEMATOLOGY ONCOLOGY | Facility: CLINIC | Age: 81
End: 2021-05-25
Payer: MEDICARE

## 2021-05-25 VITALS
RESPIRATION RATE: 16 BRPM | BODY MASS INDEX: 25.29 KG/M2 | TEMPERATURE: 97.7 F | HEART RATE: 81 BPM | DIASTOLIC BLOOD PRESSURE: 74 MMHG | WEIGHT: 151.99 LBS | SYSTOLIC BLOOD PRESSURE: 126 MMHG | OXYGEN SATURATION: 96 %

## 2021-05-25 DIAGNOSIS — M85.80 OTHER SPECIFIED DISORDERS OF BONE DENSITY AND STRUCTURE, UNSPECIFIED SITE: ICD-10-CM

## 2021-05-25 PROCEDURE — 99214 OFFICE O/P EST MOD 30 MIN: CPT

## 2021-05-26 ENCOUNTER — NON-APPOINTMENT (OUTPATIENT)
Age: 81
End: 2021-05-26

## 2021-05-28 ENCOUNTER — RX RENEWAL (OUTPATIENT)
Age: 81
End: 2021-05-28

## 2021-06-01 ENCOUNTER — NON-APPOINTMENT (OUTPATIENT)
Age: 81
End: 2021-06-01

## 2021-06-01 ENCOUNTER — APPOINTMENT (OUTPATIENT)
Dept: CARDIOLOGY | Facility: CLINIC | Age: 81
End: 2021-06-01
Payer: MEDICARE

## 2021-06-01 VITALS
DIASTOLIC BLOOD PRESSURE: 74 MMHG | HEART RATE: 82 BPM | HEIGHT: 65 IN | SYSTOLIC BLOOD PRESSURE: 124 MMHG | BODY MASS INDEX: 25.33 KG/M2 | OXYGEN SATURATION: 98 % | WEIGHT: 152 LBS

## 2021-06-01 PROCEDURE — 93000 ELECTROCARDIOGRAM COMPLETE: CPT

## 2021-06-01 PROCEDURE — 99205 OFFICE O/P NEW HI 60 MIN: CPT

## 2021-06-01 NOTE — DISCUSSION/SUMMARY
[FreeTextEntry1] : Patient is is a 80 year-old woman with cardiovascular risk factors as above including hypertension, dyslipidemia, and fibromuscular dysplasia status post unilateral renal angioplasty in remote past, presents today for evaluation.\par She has an abnormal ECG at baseline with nonspecific ST depressions.\par Blood pressure is well controlled today.\par \par Echocardiogram to evaluate for structural heart disease.\par Carotid ultrasound to evaluate FMD.\par \par Patient referred to see Vascular Cardiology for further evaluation and management of FMD.

## 2021-06-01 NOTE — HISTORY OF PRESENT ILLNESS
[FreeTextEntry1] : Patient is a 80 year-old white woman with known past medical history of hypertension, dyslipidemia, and fibromuscular dysplasia (FMD), presents today for evaluation.\par She was diagnosed with FMD when she wanted to start OCP but was told her blood pressure was too high. She had unilateral angioplasty.\par She is maintained on losartan, HCTZ, and amlodipine. She has normal renal function.\par \par One episode of syncope in her life, during her first pregnancy at age 20. No syncope since, but occasional presyncope. \par \par [place] in [place], [place] and [place].\par Patient has three children. The oldest was born when she was almost 21.\par Two children were born when she was living in [place] in the late 1970s.\par She went back to school in the 1990s. Bachelors in 1996, then two masters degrees. Got her PhD at age 77.

## 2021-06-07 NOTE — HISTORY OF PRESENT ILLNESS
[Disease: _____________________] : Disease: [unfilled] [T: ___] : T[unfilled] [N: ___] : N[unfilled] [M: ___] : M[unfilled] [AJCC Stage: ____] : AJCC Stage: [unfilled] [de-identified] : Patient had screening mammogram on 01/24/19 that showed a mass in the lower inner quadrant of the right breast; BiRADS: 0. Follow-up mammo on 01/29/19 showed a 10 mm irregular focal asymmetry of the posterior lower central right breast. US of the right breast on 01/29/19 showed no sonographic evidence of malignancy. 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 ER>95%, MA >95%\par \par Right partial mastectomy on 04/24/19 Dr. Demetrius Mendes: DCIS intermediate grade, 1.3cm, <1mm margin anterior, TisNx\par \par 5/2/19 last seen by Dr. Mendes - per documentation told "margin is close his the anterior margin was right under the skin is nothing more that I can do to clear that area." Referred to medical oncology at that time. [de-identified] : DCIS, int nuclear grade, ER+OK+ [de-identified] : \par Previously seen by Dr. Joann Ingram. Transfer of care.\par Started on anastrozole with severe joint pains, subsequently switched to exemestane 9/17/2020-2/19/2021\par Tamoxifen was then recommended on 2/19/2021, pt has not started yet, joint pain in fingers slightly better, b/l swelling improved. Plans to start 6/1/2021.\par h/o arthritis prior to anastrazole but denied any joint pains\par Not seeing Dr. Demetrius Mendes anymore, discharged from clinic\par Mammo/sono 3/10/2021 - BIRADS2\par Labs reviewed 4/21/2021\par \par HEALTH MAINTENANCE\par PCP Dr. Velarde, PMD Dr. Perez last physical 4/21/2021 HTN 2/2 fibromuscular dysplasia, HLD, rec to see cardiologist Dr. Nicholson 6/1 for abnormal EKG\par Last eye exam Dr. Tomas 4/22/2021, cataracts beginning\par Colonoscopy 4/2019, no polyps, no repeat\par Last GYN , no vaginal spotting or bleeding, LMP age 44. \par Last derm exam Dr. Daugherty, no h/o skin cancer, h/o skin biopsies\par Fibromuscular dysplasia s/p unilateral angioplasty - Nephrology Dr. 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 She travels frequently and extensively.\par Vertigo had for 'years', comes and goes, had it for 1 week, not wanting to take medications\par COVID vaccine MODERNA 2nd vaccine 3/16/2021\par 6/21 - trip to [facility] and will be back in 9/2021

## 2021-06-07 NOTE — ASSESSMENT
[FreeTextEntry1] : Right breast ER+NM+ DCIS s/p lumpectomy, on AI here for follow up.\par \par -Pt plans to start tamoxifen 6/1/2021. joint pain in fingers slightly better, b/l swelling improved 2/2 exemestane\par -up to date with mammo/US\par -clinically MALINA\par -labs reviewed 4/21/2021\par

## 2021-06-07 NOTE — PHYSICAL EXAM
[Fully active, able to carry on all pre-disease performance without restriction] : Status 0 - Fully active, able to carry on all pre-disease performance without restriction [Normal] : affect appropriate [de-identified] : right lumpectomy scar no mass; left breast no mass, no adenopathy b/l

## 2021-06-08 ENCOUNTER — RESULT REVIEW (OUTPATIENT)
Age: 81
End: 2021-06-08

## 2021-06-14 ENCOUNTER — APPOINTMENT (OUTPATIENT)
Dept: NEPHROLOGY | Facility: CLINIC | Age: 81
End: 2021-06-14
Payer: MEDICARE

## 2021-06-14 VITALS — DIASTOLIC BLOOD PRESSURE: 76 MMHG | SYSTOLIC BLOOD PRESSURE: 130 MMHG

## 2021-06-14 PROCEDURE — 99214 OFFICE O/P EST MOD 30 MIN: CPT

## 2021-06-14 RX ORDER — CELECOXIB 200 MG/1
200 CAPSULE ORAL
Qty: 30 | Refills: 3 | Status: DISCONTINUED | COMMUNITY
Start: 2021-04-01 | End: 2021-06-14

## 2021-06-14 NOTE — ASSESSMENT
[FreeTextEntry1] : Ms. Chavez is a 80 years old woman with FMD who presents today for follow up.\par \par Hypertension -- Secondary to fibromuscular dysplasia.  The patient's blood pressure presently at goal.  I have reviewed the notes from Dr. Posadas as well as from orthopedics, Dr. Nicholson, Hematology.  I have provided my independent assessment that her kidney condition is stable.  We reviewed the importance of good blood pressure control.  She is doing very well on her current regimen.  She is going to follow up with the additional cardiology work up suggested by Dr. Nicholson.\par \par f/u in six months.

## 2021-06-14 NOTE — PHYSICAL EXAM
[General Appearance - Alert] : alert [General Appearance - In No Acute Distress] : in no acute distress [General Appearance - Well Nourished] : well nourished [Outer Ear] : the ears and nose were normal in appearance [Examination Of The Oral Cavity] : the lips and gums were normal [Oropharynx] : the oropharynx was normal [Neck Appearance] : the appearance of the neck was normal [Neck Cervical Mass (___cm)] : no neck mass was observed [Jugular Venous Distention Increased] : there was no jugular-venous distention [Thyroid Nodule] : there were no palpable thyroid nodules [Auscultation Breath Sounds / Voice Sounds] : lungs were clear to auscultation bilaterally [Apical Impulse] : the apical impulse was normal [Heart Sounds] : normal S1 and S2 [Murmurs] : no murmurs [Edema] : there was no peripheral edema [Bowel Sounds] : normal bowel sounds [Abdomen Soft] : soft [Abdomen Tenderness] : non-tender [No CVA Tenderness] : no ~M costovertebral angle tenderness [Abnormal Walk] : normal gait [Involuntary Movements] : no involuntary movements were seen [Skin Color & Pigmentation] : normal skin color and pigmentation [Skin Turgor] : normal skin turgor [] : no rash [Oriented To Time, Place, And Person] : oriented to person, place, and time [Impaired Insight] : insight and judgment were intact [Affect] : the affect was normal

## 2021-06-14 NOTE — HISTORY OF PRESENT ILLNESS
[FreeTextEntry1] : Today I had the pleasure of meeting Charo Chavez.  She is a 78 years old woman with an extraordinary medical and personal history.  Although she is [place] she spent the early part of her life with her  and young son traveling and backpacking across the entire world.  She has a history of bilateral fibromuscular dysplasia and is s/p unilateral angioplasty.  Her blood pressure has been well controlled over the years and recently she's been doing well on hctz 25 and losartan 100.  Of late her blood pressure has been rising.  She reports that she just returned from her trip to [place] and she had a great time there.  Since returning she changed her diet around a lot and has lost weight.  Last night she went out to eat with her daughter's boss and had an extraordinarily salty meal.\par \par 2/1/19 -- Since our last meeting Charo has had quite a few positive things in her personal / professional life.  She has been asked to help author a book, she has been asked to speak.  Overall doing quite well.  She had an abnormal mammogram recently and so she has to go and get a breast biopsy.  Her blood pressure has been slightly on the higher side, still.  A repeat doppler done after last visit did not show any evidence of FMD.\par \par 1/6/2020 -- Since our last visit Mikhail had a breast exiscion.  She also had a problem with a paraesophageal hernia.  She had it repaired about three weeks ago and has subsequently been on a liquid diet.  She's had a couple of episodes of dysphagia which caused her to regurgitate.  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  She feels less active during the pandemic.  Her blood pressure has been elevated consistently.  Otherwise denies complaints today.\par \par 6/14/21 -- Charo has been doing well since our last visit.  She has been going to a number of doctors for her various issues.  She is planning a trip this summer.  BP has been doing well.

## 2021-06-17 ENCOUNTER — APPOINTMENT (OUTPATIENT)
Dept: HEMATOLOGY ONCOLOGY | Facility: CLINIC | Age: 81
End: 2021-06-17

## 2021-09-23 ENCOUNTER — RX RENEWAL (OUTPATIENT)
Age: 81
End: 2021-09-23

## 2021-11-01 ENCOUNTER — APPOINTMENT (OUTPATIENT)
Dept: CARDIOLOGY | Facility: CLINIC | Age: 81
End: 2021-11-01
Payer: MEDICARE

## 2021-11-01 ENCOUNTER — NON-APPOINTMENT (OUTPATIENT)
Age: 81
End: 2021-11-01

## 2021-11-01 VITALS
OXYGEN SATURATION: 99 % | WEIGHT: 149 LBS | BODY MASS INDEX: 24.79 KG/M2 | SYSTOLIC BLOOD PRESSURE: 139 MMHG | HEART RATE: 82 BPM | DIASTOLIC BLOOD PRESSURE: 83 MMHG

## 2021-11-01 DIAGNOSIS — H81.10 BENIGN PAROXYSMAL VERTIGO, UNSPECIFIED EAR: ICD-10-CM

## 2021-11-01 DIAGNOSIS — I15.0 RENOVASCULAR HYPERTENSION: ICD-10-CM

## 2021-11-01 PROCEDURE — 93000 ELECTROCARDIOGRAM COMPLETE: CPT

## 2021-11-01 PROCEDURE — 99214 OFFICE O/P EST MOD 30 MIN: CPT

## 2021-11-01 NOTE — REASON FOR VISIT
[Arrhythmia/ECG Abnorrmalities] : arrhythmia/ECG abnormalities [FreeTextEntry1] : November 1, 2021: The patient comes in describing a dizziness with the room spinning around when she lays down and turns her head to the right.  This has been happening intermittently over the past several months.  She denies any chest pains, shortness of breath, or palpitations.

## 2021-11-01 NOTE — REVIEW OF SYSTEMS
[Vertigo] : vertigo [Negative] : Heme/Lymph [SOB] : no shortness of breath [Chest Discomfort] : no chest discomfort [Palpitations] : no palpitations

## 2021-11-01 NOTE — HISTORY OF PRESENT ILLNESS
[FreeTextEntry1] : The patient is a 81 year-old white woman with known past medical history of hypertension, dyslipidemia, and fibromuscular dysplasia (FMD), presents today for evaluation.\par She was diagnosed with FMD when she wanted to start OCP but was told her blood pressure was too high. She had unilateral angioplasty.\par She is maintained on losartan, HCTZ, and amlodipine. She has normal renal function.\par \par One episode of syncope in her life, during her first pregnancy at age 20. No syncope since, but occasional presyncope. \par \par [place] in [place], and [place].\par Patient has three children. The oldest was born when she was almost 21.\par Two children were born when she was living in [place] in the late 1970s.\par She went back to school in the 1990s. Bachelors in 1996, then two masters degrees. Got her PhD at age 77.

## 2021-11-01 NOTE — DISCUSSION/SUMMARY
[FreeTextEntry1] : The patient is is a 81 year-old woman with cardiovascular risk factors as above including hypertension, dyslipidemia, and fibromuscular dysplasia status post unilateral renal angioplasty in remote past, presents today for evaluation.\par The blood pressure is well controlled today\par The patient was told about meclizine but she is does not wish to start another pill now.  She was told to follow-up with an ear nose and throat doctor about the vertigo.  She was told about the Epley maneuver.  No new medications were prescribed at today's visit.  Her EKG today was normal.  She will return as needed.  Total time spent on the day of the encounter was 31 minutes which includes  face-to-face and non face-to-face times personally spent by the physician preparing to see the patient, obtaining  separately obtained history, performing a medically appropriate exam and evaluation, counseling, educating, talking to the family or caregivers, ordering medicines, ordering tests or procedures, referring and communicating with other healthcare  professionals, and documenting clinical information in the electronic health record.

## 2021-11-29 ENCOUNTER — TRANSCRIPTION ENCOUNTER (OUTPATIENT)
Age: 81
End: 2021-11-29

## 2022-01-06 ENCOUNTER — TRANSCRIPTION ENCOUNTER (OUTPATIENT)
Age: 82
End: 2022-01-06

## 2022-02-10 ENCOUNTER — APPOINTMENT (OUTPATIENT)
Dept: NEPHROLOGY | Facility: CLINIC | Age: 82
End: 2022-02-10
Payer: MEDICARE

## 2022-02-10 VITALS
WEIGHT: 143 LBS | SYSTOLIC BLOOD PRESSURE: 119 MMHG | DIASTOLIC BLOOD PRESSURE: 90 MMHG | HEIGHT: 65 IN | TEMPERATURE: 97.3 F | HEART RATE: 87 BPM | BODY MASS INDEX: 23.82 KG/M2 | OXYGEN SATURATION: 97 %

## 2022-02-10 DIAGNOSIS — I77.3 ARTERIAL FIBROMUSCULAR DYSPLASIA: ICD-10-CM

## 2022-02-10 PROCEDURE — 99214 OFFICE O/P EST MOD 30 MIN: CPT

## 2022-02-10 NOTE — REVIEW OF SYSTEMS
[Fever] : no fever [Chills] : no chills [Feeling Poorly] : not feeling poorly [Feeling Tired] : not feeling tired [Eyesight Problems] : no eyesight problems [Nosebleeds] : no nosebleeds [Chest Pain] : no chest pain [Lower Ext Edema] : no lower extremity edema [Shortness Of Breath] : no shortness of breath [Wheezing] : no wheezing [Cough] : no cough [SOB on Exertion] : no shortness of breath during exertion [Abdominal Pain] : no abdominal pain [Vomiting] : no vomiting [Dysuria] : no dysuria [Incontinence] : no incontinence [Arthralgias] : no arthralgias [Itching] : no itching [As Noted in HPI] : as noted in HPI [Dizziness] : no dizziness [Fainting] : no fainting [Anxiety] : anxiety [Depression] : no depression [Easy Bleeding] : no tendency for easy bleeding [Easy Bruising] : no tendency for easy bruising

## 2022-02-10 NOTE — ASSESSMENT
[FreeTextEntry1] : Ms. 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  The patient's blood pressure presently at goal.  I have reviewed the notes from Dr. Nicholson as well as ultrasound results from 2018 as well as 2019.  Left kidney size seems to be getting smaller.  I have provided my independent assessment that her kidney condition is stable overall however it is prudent to check a repeat renal artery doppler.  We reviewed the importance of good blood pressure control.  She is doing very well on her current regimen.  She is going to follow up with me in about six months if all else remains stable.  Will do blood work today as she has not had some in some time.\par \par f/u in nine months.

## 2022-02-10 NOTE — PHYSICAL EXAM
[General Appearance - Alert] : alert [General Appearance - In No Acute Distress] : in no acute distress [General Appearance - Well Nourished] : well nourished [Examination Of The Oral Cavity] : the lips and gums were normal [Neck Appearance] : the appearance of the neck was normal [Neck Cervical Mass (___cm)] : no neck mass was observed [Jugular Venous Distention Increased] : there was no jugular-venous distention [Thyroid Nodule] : there were no palpable thyroid nodules [Auscultation Breath Sounds / Voice Sounds] : lungs were clear to auscultation bilaterally [Heart Sounds] : normal S1 and S2 [Murmurs] : no murmurs [Edema] : there was no peripheral edema [Abdomen Soft] : soft [Abdomen Tenderness] : non-tender [No CVA Tenderness] : no ~M costovertebral angle tenderness [Abnormal Walk] : normal gait [Involuntary Movements] : no involuntary movements were seen [Skin Color & Pigmentation] : normal skin color and pigmentation [Skin Turgor] : normal skin turgor [] : no rash [Oriented To Time, Place, And Person] : oriented to person, place, and time [Impaired Insight] : insight and judgment were intact [Affect] : the affect was normal

## 2022-02-10 NOTE — HISTORY OF PRESENT ILLNESS
[FreeTextEntry1] : Today I had the pleasure of meeting Charo Chavez.  She is a 78 years old woman with an extraordinary medical and personal history.  Although she is [place] she spent the early part of her life with her  and young son traveling and backpacking across the entire world.  She has a history of bilateral fibromuscular dysplasia and is s/p unilateral angioplasty.  Her blood pressure has been well controlled over the years and recently she's been doing well on hctz 25 and losartan 100.  Of late her blood pressure has been rising.  She reports that she just returned from her trip to [place] and she had a great time there.  Since returning she changed her diet around a lot and has lost weight.  Last night she went out to eat with her daughter's boss and had an extraordinarily salty meal.\par \par 2/1/19 -- Since our last meeting Charo has had quite a few positive things in her personal / professional life.  She has been asked to help author a book, she has been asked to speak.  Overall doing quite well.  She had an abnormal mammogram recently and so she has to go and get a breast biopsy.  Her blood pressure has been slightly on the higher side, still.  A repeat doppler done after last visit did not show any evidence of FMD.\par \par 1/6/2020 -- Since our last visit Mikhail had a breast exiscion.  She also had a problem with a paraesophageal hernia.  She had it repaired about three weeks ago and has subsequently been on a liquid diet.  She's had a couple of episodes of dysphagia which caused her to regurgitate.  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  She feels less active during the pandemic.  Her blood pressure has been elevated consistently.  Otherwise denies complaints today.\par \par 6/14/21 -- Charo has been doing well since our last visit.  She has been going to a number of doctors for her various issues.  She is planning a trip this summer.  BP has been doing well.\par \par 2/10/22 -- Charo has continued to work.  Her main complaint is that she has difficulty with poor appetite particularly since starting tamoxifen.  She has no chest pain no shortness of breath.

## 2022-02-11 ENCOUNTER — NON-APPOINTMENT (OUTPATIENT)
Age: 82
End: 2022-02-11

## 2022-02-11 LAB
ALBUMIN SERPL ELPH-MCNC: 3.4 G/DL
ANION GAP SERPL CALC-SCNC: NORMAL
BASOPHILS # BLD AUTO: 0.05 K/UL
BASOPHILS NFR BLD AUTO: 0.8 %
BUN SERPL-MCNC: 138 MG/DL
CALCIUM SERPL-MCNC: 8.2 MG/DL
CHLORIDE SERPL-SCNC: 99 MMOL/L
CO2 SERPL-SCNC: 22 MMOL/L
CREAT SERPL-MCNC: 36.53 MG/DL
CREAT SPEC-SCNC: 185 MG/DL
CREAT/PROT UR: 0.1 RATIO
EOSINOPHIL # BLD AUTO: 0.04 K/UL
EOSINOPHIL NFR BLD AUTO: 0.6 %
ESTIMATED AVERAGE GLUCOSE: 117 MG/DL
GLUCOSE SERPL-MCNC: 80 MG/DL
HBA1C MFR BLD HPLC: 5.7 %
HCT VFR BLD CALC: 39.2 %
HGB BLD-MCNC: 12.8 G/DL
IMM GRANULOCYTES NFR BLD AUTO: 0.5 %
IRON SATN MFR SERPL: 34 %
IRON SERPL-MCNC: 89 UG/DL
LYMPHOCYTES # BLD AUTO: 2.5 K/UL
LYMPHOCYTES NFR BLD AUTO: 38.8 %
MAN DIFF?: NORMAL
MCHC RBC-ENTMCNC: 31 PG
MCHC RBC-ENTMCNC: 32.7 GM/DL
MCV RBC AUTO: 94.9 FL
MONOCYTES # BLD AUTO: 0.54 K/UL
MONOCYTES NFR BLD AUTO: 8.4 %
NEUTROPHILS # BLD AUTO: 3.29 K/UL
NEUTROPHILS NFR BLD AUTO: 50.9 %
PHOSPHATE SERPL-MCNC: 19.5 MG/DL
PLATELET # BLD AUTO: 227 K/UL
POTASSIUM SERPL-SCNC: > 10 MMOL/L
PROT UR-MCNC: 10 MG/DL
RBC # BLD: 4.13 M/UL
RBC # FLD: 12.4 %
SODIUM SERPL-SCNC: 119 MMOL/L
TIBC SERPL-MCNC: 265 UG/DL
UIBC SERPL-MCNC: 176 UG/DL
WBC # FLD AUTO: 6.45 K/UL

## 2022-03-04 ENCOUNTER — APPOINTMENT (OUTPATIENT)
Dept: ULTRASOUND IMAGING | Facility: IMAGING CENTER | Age: 82
End: 2022-03-04
Payer: MEDICARE

## 2022-03-04 ENCOUNTER — OUTPATIENT (OUTPATIENT)
Dept: OUTPATIENT SERVICES | Facility: HOSPITAL | Age: 82
LOS: 1 days | End: 2022-03-04
Payer: MEDICARE

## 2022-03-04 DIAGNOSIS — I10 ESSENTIAL (PRIMARY) HYPERTENSION: ICD-10-CM

## 2022-03-04 PROCEDURE — 93975 VASCULAR STUDY: CPT

## 2022-03-04 PROCEDURE — 93975 VASCULAR STUDY: CPT | Mod: 26

## 2022-03-08 LAB
ALBUMIN SERPL ELPH-MCNC: 4.4 G/DL
ANION GAP SERPL CALC-SCNC: 10 MMOL/L
BUN SERPL-MCNC: 20 MG/DL
CALCIUM SERPL-MCNC: 9.6 MG/DL
CHLORIDE SERPL-SCNC: 103 MMOL/L
CO2 SERPL-SCNC: 28 MMOL/L
CREAT SERPL-MCNC: 1.29 MG/DL
GLUCOSE SERPL-MCNC: 90 MG/DL
PHOSPHATE SERPL-MCNC: 3.1 MG/DL
POTASSIUM SERPL-SCNC: 4.4 MMOL/L
SODIUM SERPL-SCNC: 142 MMOL/L

## 2022-03-17 ENCOUNTER — APPOINTMENT (OUTPATIENT)
Dept: ULTRASOUND IMAGING | Facility: IMAGING CENTER | Age: 82
End: 2022-03-17

## 2022-03-17 ENCOUNTER — APPOINTMENT (OUTPATIENT)
Dept: MAMMOGRAPHY | Facility: IMAGING CENTER | Age: 82
End: 2022-03-17

## 2022-04-04 ENCOUNTER — RX RENEWAL (OUTPATIENT)
Age: 82
End: 2022-04-04

## 2022-04-21 ENCOUNTER — OUTPATIENT (OUTPATIENT)
Dept: OUTPATIENT SERVICES | Facility: HOSPITAL | Age: 82
LOS: 1 days | Discharge: HOME | End: 2022-04-21

## 2022-04-21 DIAGNOSIS — D05.90 UNSPECIFIED TYPE OF CARCINOMA IN SITU OF UNSPECIFIED BREAST: ICD-10-CM

## 2022-04-26 ENCOUNTER — APPOINTMENT (OUTPATIENT)
Dept: HEMATOLOGY ONCOLOGY | Facility: CLINIC | Age: 82
End: 2022-04-26
Payer: MEDICARE

## 2022-04-26 VITALS
BODY MASS INDEX: 23.66 KG/M2 | DIASTOLIC BLOOD PRESSURE: 57 MMHG | WEIGHT: 142 LBS | TEMPERATURE: 97.5 F | SYSTOLIC BLOOD PRESSURE: 107 MMHG | OXYGEN SATURATION: 99 % | HEIGHT: 65 IN | HEART RATE: 88 BPM | RESPIRATION RATE: 16 BRPM

## 2022-04-26 PROCEDURE — 99214 OFFICE O/P EST MOD 30 MIN: CPT

## 2022-04-26 NOTE — PHYSICAL EXAM
[Fully active, able to carry on all pre-disease performance without restriction] : Status 0 - Fully active, able to carry on all pre-disease performance without restriction [Normal] : affect appropriate [de-identified] : right lumpectomy scar no mass; left breast no mass, no adenopathy b/l

## 2022-04-26 NOTE — ASSESSMENT
[FreeTextEntry1] : Right breast ER+NV+ DCIS s/p lumpectomy, on AI here for follow up.\par \par -on tamoxifen tolerating well\par -mammo/US due now, script in [employer]\par -clinically MALINA\par -discharged from breast surgeon clinic\par -recent labs reviewed\par -FU in 6mos\par

## 2022-04-26 NOTE — HISTORY OF PRESENT ILLNESS
[Disease: _____________________] : Disease: [unfilled] [T: ___] : T[unfilled] [N: ___] : N[unfilled] [M: ___] : M[unfilled] [AJCC Stage: ____] : AJCC Stage: [unfilled] [de-identified] : Patient had screening mammogram on 01/24/19 that showed a mass in the lower inner quadrant of the right breast; BiRADS: 0. Follow-up mammo on 01/29/19 showed a 10 mm irregular focal asymmetry of the posterior lower central right breast. US of the right breast on 01/29/19 showed no sonographic evidence of malignancy. 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 ER>95%, TX >95%\par \par Right partial mastectomy on 04/24/19 Dr. Demetrius Mendes: DCIS intermediate grade, 1.3cm, <1mm margin anterior, TisNx\par \par 5/2/19 last seen by Dr. Mendes - per documentation told "margin is close his the anterior margin was right under the skin is nothing more that I can do to clear that area." Referred to medical oncology at that time. \par \par Initially started on anastrazole c/b joint symptoms, trial of all three aromatase inhibitors, and eventually switched to tamoxifen  6/1/2021 after 4 month hiatus.\par \par Her sister had breast cancer, another sister with leukemia, father had bladder cancer. [de-identified] : DCIS, int nuclear grade, ER+OR+ [de-identified] : \par On tamoxifen tolerating well\par Not seeing Dr. Demetrius Mendes anymore, discharged from clinic\par Mammo/sono 3/10/2021 - BIRADS2, due now, ordered\par Labs reviewed 4/21/2021\par \par HEALTH MAINTENANCE\par PCP Dr. Velarde, PMD Dr. Perez sees yearly in 4/2022, HTN 2/2 fibromuscular dysplasia, HLD, rec to see cardiologist Dr. Nicholson 6/1 for abnormal EKG\par Last eye exam Dr. Tomas 4/22/2021, cataracts, may need removal \par Colonoscopy 4/2019, no polyps, no repeat\par Last GYN , no vaginal spotting or bleeding, LMP age 44. \par Last derm exam Dr. Daugherty, no h/o skin cancer, h/o skin biopsies\par Fibromuscular dysplasia s/p unilateral angioplasty \par Nephrology Dr. 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 \par She travels frequently and extensively to [place] where she's from\par daughter recently diagnosed with DCIS

## 2022-05-03 ENCOUNTER — RESULT REVIEW (OUTPATIENT)
Age: 82
End: 2022-05-03

## 2022-05-16 ENCOUNTER — APPOINTMENT (OUTPATIENT)
Dept: INTERNAL MEDICINE | Facility: CLINIC | Age: 82
End: 2022-05-16
Payer: MEDICARE

## 2022-05-16 VITALS
HEART RATE: 72 BPM | OXYGEN SATURATION: 99 % | SYSTOLIC BLOOD PRESSURE: 128 MMHG | WEIGHT: 140 LBS | TEMPERATURE: 97.7 F | RESPIRATION RATE: 16 BRPM | HEIGHT: 65 IN | BODY MASS INDEX: 23.32 KG/M2 | DIASTOLIC BLOOD PRESSURE: 79 MMHG

## 2022-05-16 PROCEDURE — G0439: CPT

## 2022-05-17 LAB
ALBUMIN SERPL ELPH-MCNC: 4.4 G/DL
ALP BLD-CCNC: 63 U/L
ALT SERPL-CCNC: 20 U/L
ANION GAP SERPL CALC-SCNC: 12 MMOL/L
AST SERPL-CCNC: 33 U/L
BASOPHILS # BLD AUTO: 0.07 K/UL
BASOPHILS NFR BLD AUTO: 1.2 %
BILIRUB SERPL-MCNC: 0.4 MG/DL
BUN SERPL-MCNC: 15 MG/DL
CALCIUM SERPL-MCNC: 9.4 MG/DL
CHLORIDE SERPL-SCNC: 100 MMOL/L
CHOLEST SERPL-MCNC: 139 MG/DL
CO2 SERPL-SCNC: 28 MMOL/L
CREAT SERPL-MCNC: 0.99 MG/DL
EGFR: 58 ML/MIN/1.73M2
EOSINOPHIL # BLD AUTO: 0.06 K/UL
EOSINOPHIL NFR BLD AUTO: 1 %
ESTIMATED AVERAGE GLUCOSE: 117 MG/DL
GLUCOSE SERPL-MCNC: 96 MG/DL
HBA1C MFR BLD HPLC: 5.7 %
HCT VFR BLD CALC: 42.5 %
HDLC SERPL-MCNC: 71 MG/DL
HGB BLD-MCNC: 13.2 G/DL
IMM GRANULOCYTES NFR BLD AUTO: 0.2 %
LDLC SERPL CALC-MCNC: 51 MG/DL
LYMPHOCYTES # BLD AUTO: 2.15 K/UL
LYMPHOCYTES NFR BLD AUTO: 35.4 %
MAN DIFF?: NORMAL
MCHC RBC-ENTMCNC: 30.3 PG
MCHC RBC-ENTMCNC: 31.1 GM/DL
MCV RBC AUTO: 97.7 FL
MONOCYTES # BLD AUTO: 0.44 K/UL
MONOCYTES NFR BLD AUTO: 7.2 %
NEUTROPHILS # BLD AUTO: 3.35 K/UL
NEUTROPHILS NFR BLD AUTO: 55 %
NONHDLC SERPL-MCNC: 69 MG/DL
PLATELET # BLD AUTO: 218 K/UL
POTASSIUM SERPL-SCNC: 4.3 MMOL/L
PROT SERPL-MCNC: 6.5 G/DL
RBC # BLD: 4.35 M/UL
RBC # FLD: 12.5 %
SODIUM SERPL-SCNC: 140 MMOL/L
TRIGL SERPL-MCNC: 86 MG/DL
TSH SERPL-ACNC: 1.35 UIU/ML
WBC # FLD AUTO: 6.08 K/UL

## 2022-05-20 ENCOUNTER — OUTPATIENT (OUTPATIENT)
Dept: OUTPATIENT SERVICES | Facility: HOSPITAL | Age: 82
LOS: 1 days | End: 2022-05-20
Payer: MEDICARE

## 2022-05-20 ENCOUNTER — RESULT REVIEW (OUTPATIENT)
Age: 82
End: 2022-05-20

## 2022-05-20 ENCOUNTER — APPOINTMENT (OUTPATIENT)
Dept: RADIOLOGY | Facility: CLINIC | Age: 82
End: 2022-05-20
Payer: MEDICARE

## 2022-05-20 ENCOUNTER — APPOINTMENT (OUTPATIENT)
Dept: MAMMOGRAPHY | Facility: CLINIC | Age: 82
End: 2022-05-20
Payer: MEDICARE

## 2022-05-20 ENCOUNTER — APPOINTMENT (OUTPATIENT)
Dept: ULTRASOUND IMAGING | Facility: CLINIC | Age: 82
End: 2022-05-20
Payer: MEDICARE

## 2022-05-20 DIAGNOSIS — Z00.00 ENCOUNTER FOR GENERAL ADULT MEDICAL EXAMINATION WITHOUT ABNORMAL FINDINGS: ICD-10-CM

## 2022-05-20 PROCEDURE — 77085 DXA BONE DENSITY AXL VRT FX: CPT | Mod: 26

## 2022-05-20 PROCEDURE — 77066 DX MAMMO INCL CAD BI: CPT | Mod: 26

## 2022-05-20 PROCEDURE — 76641 ULTRASOUND BREAST COMPLETE: CPT

## 2022-05-20 PROCEDURE — G0279: CPT | Mod: 26

## 2022-05-20 PROCEDURE — 77066 DX MAMMO INCL CAD BI: CPT

## 2022-05-20 PROCEDURE — 76641 ULTRASOUND BREAST COMPLETE: CPT | Mod: 26,50

## 2022-05-20 PROCEDURE — G0279: CPT

## 2022-05-20 PROCEDURE — 77085 DXA BONE DENSITY AXL VRT FX: CPT

## 2022-05-23 ENCOUNTER — NON-APPOINTMENT (OUTPATIENT)
Age: 82
End: 2022-05-23

## 2022-06-02 ENCOUNTER — APPOINTMENT (OUTPATIENT)
Dept: NEPHROLOGY | Facility: CLINIC | Age: 82
End: 2022-06-02
Payer: MEDICARE

## 2022-06-02 VITALS
TEMPERATURE: 97.2 F | HEIGHT: 65 IN | OXYGEN SATURATION: 98 % | SYSTOLIC BLOOD PRESSURE: 127 MMHG | DIASTOLIC BLOOD PRESSURE: 77 MMHG | HEART RATE: 76 BPM

## 2022-06-02 PROCEDURE — 99214 OFFICE O/P EST MOD 30 MIN: CPT

## 2022-06-02 NOTE — HISTORY OF PRESENT ILLNESS
[FreeTextEntry1] : Today I had the pleasure of meeting Charo Chavez.  She is a 78 years old woman with an extraordinary medical and personal history.  Although she is [place] she spent the early part of her life with her  and young son traveling and backpacking across the entire world.  She has a history of bilateral fibromuscular dysplasia and is s/p unilateral angioplasty.  Her blood pressure has been well controlled over the years and recently she's been doing well on hctz 25 and losartan 100.  Of late her blood pressure has been rising.  She reports that she just returned from her trip to [place] and she had a great time there.  Since returning she changed her diet around a lot and has lost weight.  Last night she went out to eat with her daughter's boss and had an extraordinarily salty meal.\par \par 2/1/19 -- Since our last meeting Charo has had quite a few positive things in her personal / professional life.  She has been asked to help author a book, she has been asked to speak.  Overall doing quite well.  She had an abnormal mammogram recently and so she has to go and get a breast biopsy.  Her blood pressure has been slightly on the higher side, still.  A repeat doppler done after last visit did not show any evidence of FMD.\par \par 1/6/2020 -- Since our last visit Mikhail had a breast exiscion.  She also had a problem with a paraesophageal hernia.  She had it repaired about three weeks ago and has subsequently been on a liquid diet.  She's had a couple of episodes of dysphagia which caused her to regurgitate.  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  She feels less active during the pandemic.  Her blood pressure has been elevated consistently.  Otherwise denies complaints today.\par \par 6/14/21 -- Charo has been doing well since our last visit.  She has been going to a number of doctors for her various issues.  She is planning a trip this summer.  BP has been doing well.\par \par 2/10/22 -- Charo has continued to work.  Her main complaint is that she has difficulty with poor appetite particularly since starting tamoxifen.  She has no chest pain no shortness of breath.\par \par 6/2/22 -- Today I saw Charo she is feeling well overall.  REcently switched to tamoxifen for her breast cancer started to experience muscle cramps at night which resolved with taking OTC magnesium.

## 2022-06-02 NOTE — PHYSICAL EXAM
[General Appearance - Alert] : alert [General Appearance - In No Acute Distress] : in no acute distress [General Appearance - Well Nourished] : well nourished [Examination Of The Oral Cavity] : the lips and gums were normal [Neck Appearance] : the appearance of the neck was normal [Thyroid Nodule] : there were no palpable thyroid nodules [] : no respiratory distress [Auscultation Breath Sounds / Voice Sounds] : lungs were clear to auscultation bilaterally [Heart Sounds] : normal S1 and S2 [Murmurs] : no murmurs [Edema] : there was no peripheral edema [No CVA Tenderness] : no ~M costovertebral angle tenderness [Abnormal Walk] : normal gait [Involuntary Movements] : no involuntary movements were seen [Oriented To Time, Place, And Person] : oriented to person, place, and time [Impaired Insight] : insight and judgment were intact [Affect] : the affect was normal

## 2022-06-02 NOTE — ASSESSMENT
[FreeTextEntry1] : Ms. 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  The patient's blood pressure presently at goal.  I have reviewed the notes from Dr. Nicholson as well as ultrasound results from 2018 as well as 2019 and 2022.  Left kidney size seems to be getting smaller.  I have provided my independent assessment that her kidney condition is stable overall.  We reviewed the importance of good blood pressure control.  She is doing very well on her current regimen.  She is going to follow up with me in about nine months if all else remains stable.  \par \par Muscle Cramps -- resolved with OTC magnesium daily will check magnesium level today.\par \par f/u in nine months.

## 2022-06-03 LAB — MAGNESIUM SERPL-MCNC: 2.1 MG/DL

## 2022-06-28 ENCOUNTER — RX RENEWAL (OUTPATIENT)
Age: 82
End: 2022-06-28

## 2022-07-22 ENCOUNTER — RX RENEWAL (OUTPATIENT)
Age: 82
End: 2022-07-22

## 2022-08-23 ENCOUNTER — APPOINTMENT (OUTPATIENT)
Dept: ENDOCRINOLOGY | Facility: CLINIC | Age: 82
End: 2022-08-23

## 2022-08-25 NOTE — ASU DISCHARGE PLAN (ADULT/PEDIATRIC) - C. MAKE IMPORTANT PERSONAL OR BUSINESS DECISIONS
Called patient to complete TCM, states Dr. Yung is no longer his PCP.  PCP updated.    Statement Selected

## 2022-09-01 ENCOUNTER — APPOINTMENT (OUTPATIENT)
Dept: INTERNAL MEDICINE | Facility: CLINIC | Age: 82
End: 2022-09-01

## 2022-09-26 ENCOUNTER — NON-APPOINTMENT (OUTPATIENT)
Age: 82
End: 2022-09-26

## 2022-10-06 ENCOUNTER — APPOINTMENT (OUTPATIENT)
Dept: ENDOCRINOLOGY | Facility: CLINIC | Age: 82
End: 2022-10-06

## 2022-10-06 VITALS
DIASTOLIC BLOOD PRESSURE: 70 MMHG | SYSTOLIC BLOOD PRESSURE: 122 MMHG | WEIGHT: 140 LBS | BODY MASS INDEX: 23.32 KG/M2 | HEIGHT: 65 IN

## 2022-10-06 PROCEDURE — 99204 OFFICE O/P NEW MOD 45 MIN: CPT | Mod: 25

## 2022-10-06 PROCEDURE — 36415 COLL VENOUS BLD VENIPUNCTURE: CPT

## 2022-10-09 LAB
ALBUMIN SERPL ELPH-MCNC: 4.2 G/DL
ALP BLD-CCNC: 64 U/L
ALT SERPL-CCNC: 19 U/L
ANION GAP SERPL CALC-SCNC: 9 MMOL/L
AST SERPL-CCNC: 25 U/L
BILIRUB SERPL-MCNC: 0.4 MG/DL
BUN SERPL-MCNC: 21 MG/DL
CALCIUM SERPL-MCNC: 9.6 MG/DL
CALCIUM SERPL-MCNC: 9.6 MG/DL
CHLORIDE SERPL-SCNC: 100 MMOL/L
CO2 SERPL-SCNC: 30 MMOL/L
CREAT SERPL-MCNC: 1.04 MG/DL
EGFR: 54 ML/MIN/1.73M2
FSH SERPL-MCNC: 76.9 IU/L
GLUCOSE SERPL-MCNC: 104 MG/DL
LH SERPL-ACNC: 53.6 IU/L
PARATHYROID HORMONE INTACT: 58 PG/ML
POTASSIUM SERPL-SCNC: 3.8 MMOL/L
PROLACTIN SERPL-MCNC: 6.8 NG/ML
PROT SERPL-MCNC: 6.4 G/DL
SODIUM SERPL-SCNC: 139 MMOL/L

## 2022-10-09 NOTE — HISTORY OF PRESENT ILLNESS
[Family History of Breast Cancer] : family history of breast cancer [Vitamin D (oral)] : Vitamin D orally [FreeTextEntry1] : KATHRYN AMEZCUA  is a 82 year old female with past medical history of vertigo, fibromuscular hypertension, CKD, breast DCIS, HTN, HLD, OA, prediabetes who presents today for management of results of bone density. \par \par Patient recently diagnosed with osteoporosis, not on medication. She is taking vitamin D3 5000IU po daily (125mcg) .  She denies history of fractures or falls, has occasional aches.  She exercises regularly, does stretching and uses dumbbell for weights.\par Patient denies heat or cold intolerance, dyspnea, dysphagia, dysphonia, changes in bowel habits including diarrhea or constipation or any recent change in weight.  \par \par Family Hx: No known history of osteoporosis or parental history of hip fracture [Low Calcium Intake] : no low calcium intake [Low Vit D Intake/Exposure] : no low vitamin D intake [Premat. Menopause (natural)] : no history of premature menopause [Premat. Menopause (surgery)] : no history of premature surgical menopause [Amenorrhea] : no amenorrhea [Disordered Eating] : no history of disordered eating [Taking Steroids] : no history of taking steroids [Hyperparathyroidism] : no history of hyperparathyroidism [Diabetes] : no history of diabetes [Testosterone Deficiency] : no testosterone deficiency [Kidney Stones] : no history of kidney stones [Excessive Alcohol Intake] : no excessive alcohol intake [Excessive Soda] : no excessive soda [Sedentary] : no sedentary lifestyle [Current Smoking___(ppd)] : not currently smoking [Previous Fragility Fracture] : no previous fragility fracture [Regular Dental Follow-Up] : no regular dental follow-up [Family History of Hip Fracture] : no family history of hip fracture [Family History of Osteoporosis] : no family history of osteoporosis [History of Radiation Therapy] : no history of radiation therapy [History of Blood Clots] : no history of blood clots [High Fall Risk] : no fall risk [Frequent Falls] : no frequent falls [Uses a Walker/Cane] : no use of a walker/cane [Inability to Stand Straight] : no inability to stand straight [Loss of Height] : no loss of height [Loss of Balance] : no loss of balance [Change in Clothing Fit] : no change in clothing fit [Weight Gain] : no weight gain [Difficulty Ambulating] : no difficulty ambulating [Hip Pain] : no hip pain [Wrist Pain] : no wrist pain [Difficulty with Stairs] : no difficulty with stairs [Arthralgias] : no arthralgias [Myalgias] : no myalgias [New Fracture] : no new fracture [de-identified] : sister- breast cancer, fromer smoker during younger years, stopped at age 19yo.

## 2022-10-09 NOTE — PHYSICAL EXAM
[Alert] : alert [No Acute Distress] : no acute distress [Normal Voice/Communication] : normal voice communication [Well Developed] : well developed [Normal Sclera/Conjunctiva] : normal sclera/conjunctiva [EOMI] : extra ocular movement intact [No Proptosis] : no proptosis [No Lid Lag] : no lid lag [No LAD] : no lymphadenopathy [Supple] : the neck was supple [Thyroid Not Enlarged] : the thyroid was not enlarged [No Thyroid Nodules] : no palpable thyroid nodules [No Respiratory Distress] : no respiratory distress [No Accessory Muscle Use] : no accessory muscle use [Normal Rate and Effort] : normal respiratory rate and effort [No Stigmata of Cushings Syndrome] : no stigmata of Cushings Syndrome [Normal Gait] : normal gait [No Involuntary Movements] : no involuntary movements were seen [No Tremors] : no tremors [Oriented x3] : oriented to person, place, and time [Normal Insight/Judgement] : insight and judgment were intact

## 2022-10-09 NOTE — CONSULT LETTER
[Dear  ___] : Dear  [unfilled], [Consult Letter:] : I had the pleasure of evaluating your patient, [unfilled]. [Please see my note below.] : Please see my note below. [Consult Closing:] : Thank you very much for allowing me to participate in the care of this patient.  If you have any questions, please do not hesitate to contact me. [Sincerely,] : Sincerely, [FreeTextEntry3] : Melanie Christiansen, DO\par Endocrinologist \par [facility]\par

## 2022-10-09 NOTE — ASSESSMENT
[Denosumab Therapy] : Risks  and benefits of denosumab therapy were discussed with the patient including eczema, cellulitis, osteonecrosis of the jaw and atypical femur fractures [Bisphosphonate Therapy] : Risks and benefits of bisphosphonate therapy were  discussed with the patient including gastroesophageal irritation, osteonecrosis of the jaw, and atypical femur fractures, and acute phase reaction [Methimazole Therapy/PTU Therapy] : Risks and benefits of methimazole therapy/PTU therapy were discussed with the patient, including rash, liver dysfunction, and agranulocytosis. Patient was instructed to call the office for flu-like symptoms (e.g. fever and sore-throat) [FreeTextEntry1] : Patient presents for evaluation of osteoporosis\par Reviewed DEXA scan from May 2022, noted to have mild osteoporosis of femoral neck and osteopenia of total hip.\par She has underlying history of CKD stage III and fibromuscular dysplasia, following regularly with nephrology.  Also following with oncology for DCIS of breast, will on tamoxifen.\par Denies history of falls, fragility fractures, notes occasional aches.\par Will do work-up for secondary causes of osteoporosis.\par Bloodwork was collected in office today, will f/u results accordingly.\par Discussed various osteoporotic therapies including bisphosphonates however not ideal in view of CKD history.  Also discussed use of denosumab (Prolia) injections, however patient is not interested in any osteoporotic therapy today.\par \par Answered all questions today; patient verbalized understanding of the above\par RTC in 6 months\par

## 2022-10-18 ENCOUNTER — NON-APPOINTMENT (OUTPATIENT)
Age: 82
End: 2022-10-18

## 2022-10-21 ENCOUNTER — OUTPATIENT (OUTPATIENT)
Dept: OUTPATIENT SERVICES | Facility: HOSPITAL | Age: 82
LOS: 1 days | Discharge: HOME | End: 2022-10-21

## 2022-10-21 DIAGNOSIS — D05.90 UNSPECIFIED TYPE OF CARCINOMA IN SITU OF UNSPECIFIED BREAST: ICD-10-CM

## 2022-10-25 ENCOUNTER — RESULT REVIEW (OUTPATIENT)
Age: 82
End: 2022-10-25

## 2022-10-25 ENCOUNTER — APPOINTMENT (OUTPATIENT)
Dept: HEMATOLOGY ONCOLOGY | Facility: CLINIC | Age: 82
End: 2022-10-25

## 2022-10-25 VITALS
BODY MASS INDEX: 24.43 KG/M2 | DIASTOLIC BLOOD PRESSURE: 68 MMHG | SYSTOLIC BLOOD PRESSURE: 102 MMHG | HEART RATE: 78 BPM | TEMPERATURE: 97.7 F | WEIGHT: 146.83 LBS | RESPIRATION RATE: 16 BRPM | OXYGEN SATURATION: 98 %

## 2022-10-25 PROCEDURE — 99215 OFFICE O/P EST HI 40 MIN: CPT

## 2022-10-26 NOTE — ASSESSMENT
[FreeTextEntry1] : 83 yo woman with history of Right breast ER+PA+ DCIS s/p lumpectomy initially treated with AI between 9/2020 and 2/2021 but could not tolerate due to severe joint swelling; changed to tamoxifen which she ultimately started in 6/2021\par \par -up to date with mammo/US with last studies in 5/2022; continue annual screenings\par - to continue endocrine therapy until 9/2025\par -clinically MALINA\par -labs reviewed from 10/6/22 with stable chem panel\par - discussed findings on DEXA scan from 5/2022; patient seen by endocrine but declining any pharmacologic therapy; encouraged weight bearing exercise\par - Patient had the opportunity to have all their questions answered to their satisfaction \par - f/u in 6 months\par

## 2022-10-26 NOTE — PHYSICAL EXAM
[Fully active, able to carry on all pre-disease performance without restriction] : Status 0 - Fully active, able to carry on all pre-disease performance without restriction [Normal] : affect appropriate [de-identified] : right lumpectomy scar in the inferior portion of the breast, no mass; left breast no mass, no adenopathy b/l

## 2022-10-26 NOTE — HISTORY OF PRESENT ILLNESS
[Disease: _____________________] : Disease: [unfilled] [T: ___] : T[unfilled] [N: ___] : N[unfilled] [M: ___] : M[unfilled] [AJCC Stage: ____] : AJCC Stage: [unfilled] [de-identified] : Patient had screening mammogram on 01/24/19 that showed a mass in the lower inner quadrant of the right breast; BiRADS: 0. Follow-up mammo on 01/29/19 showed a 10 mm irregular focal asymmetry of the posterior lower central right breast. US of the right breast on 01/29/19 showed no sonographic evidence of malignancy. 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 ER>95%, IA >95%\par \par Right partial mastectomy on 04/24/19 Dr. Demetrius Mendes: DCIS intermediate grade, 1.3cm, <1mm margin anterior, TisNx\par \par 5/2/19 last seen by Dr. Mendes - per documentation told "margin is close his the anterior margin was right under the skin is nothing more that I can do to clear that area." Referred to medical oncology at that time. [de-identified] : DCIS, int nuclear grade, ER+MD+ [de-identified] : 10/25/22\par Previously seen by Dr. Joann Ingram, then by Dr. Autumn Yeager\par Started on anastrozole with severe joint pains, subsequently switched to exemestane 9/17/2020-2/19/2021\par Tamoxifen was then recommended on 2/19/2021 but patient delayed starting until 6/1/2021; has tolerated well\par h/o arthritis prior to anastrazole but denied any joint pains\par Not seeing Dr. Demetrius Mendes anymore, discharged from clinic\par Mammo/sono 5/20/22 - BIRADS2\par \par HEALTH MAINTENANCE\par PCP Dr. Velarde, PMD Dr. Perez last physical 4/21/2021 HTN 2/2 fibromuscular dysplasia, HLD, rec to see cardiologist Dr. Nicholson 6/1 for abnormal EKG\par Last eye exam Dr. Tomas 4/22/2021, cataracts beginning\par Colonoscopy 4/2019, no polyps, no repeat\par Last GYN , no vaginal spotting or bleeding, LMP age 44. \par Last derm exam Dr. Daugherty, no h/o skin cancer, h/o skin biopsies\par Fibromuscular dysplasia s/p unilateral angioplasty - Nephrology Dr. Kenny, renal function stable\par \par DEXA 2019, femoral neck -2.3 c/w osteopenia\par DEXA 5/2022, femoral neck -2.5 c/w osteoporosis; referred to Endocrine and seen by Dr. 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 She travels frequently and extensively.\par COVID vaccine MODERNA 2nd vaccine 3/16/2021

## 2022-11-14 ENCOUNTER — APPOINTMENT (OUTPATIENT)
Dept: NEPHROLOGY | Facility: CLINIC | Age: 82
End: 2022-11-14

## 2022-11-14 VITALS
TEMPERATURE: 97.4 F | DIASTOLIC BLOOD PRESSURE: 57 MMHG | OXYGEN SATURATION: 99 % | SYSTOLIC BLOOD PRESSURE: 100 MMHG | HEART RATE: 87 BPM | HEIGHT: 65 IN

## 2022-11-14 PROCEDURE — 99214 OFFICE O/P EST MOD 30 MIN: CPT

## 2022-11-14 RX ORDER — HYDROCHLOROTHIAZIDE 25 MG/1
25 TABLET ORAL DAILY
Qty: 90 | Refills: 1 | Status: ACTIVE | COMMUNITY
Start: 2020-01-27 | End: 1900-01-01

## 2022-11-14 NOTE — HISTORY OF PRESENT ILLNESS
[FreeTextEntry1] : Today I had the pleasure of meeting Charo Chavez.  She is a 78 years old woman with an extraordinary medical and personal history.  Although she is [place] she spent the early part of her life with her  and young son traveling and backpacking across the entire world.  She has a history of bilateral fibromuscular dysplasia and is s/p unilateral angioplasty.  Her blood pressure has been well controlled over the years and recently she's been doing well on hctz 25 and losartan 100.  Of late her blood pressure has been rising.  She reports that she just returned from her trip to [place] and she had a great time there.  Since returning she changed her diet around a lot and has lost weight.  Last night she went out to eat with her daughter's boss and had an extraordinarily salty meal.\par \par 2/1/19 -- Since our last meeting Charo has had quite a few positive things in her personal / professional life.  She has been asked to help author a book, she has been asked to speak.  Overall doing quite well.  She had an abnormal mammogram recently and so she has to go and get a breast biopsy.  Her blood pressure has been slightly on the higher side, still.  A repeat doppler done after last visit did not show any evidence of FMD.\par \par 1/6/2020 -- Since our last visit Mikhail had a breast exiscion.  She also had a problem with a paraesophageal hernia.  She had it repaired about three weeks ago and has subsequently been on a liquid diet.  She's had a couple of episodes of dysphagia which caused her to regurgitate.  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  She feels less active during the pandemic.  Her blood pressure has been elevated consistently.  Otherwise denies complaints today.\par \par 6/14/21 -- Charo has been doing well since our last visit.  She has been going to a number of doctors for her various issues.  She is planning a trip this summer.  BP has been doing well.\par \par 2/10/22 -- Charo has continued to work.  Her main complaint is that she has difficulty with poor appetite particularly since starting tamoxifen.  She has no chest pain no shortness of breath.\par \par 11/14/22 -- Charo has been doing well since our last visit although she notes that of late she has been having intermittent episodes that she describes as a muscle weakness and lightheaded-ness.  It tends to come on suddenly and is associated with diaphoresis.  It is alleviated by sitting down and resting.  She reports that her blood pressure at home has been on the lower side.

## 2022-11-14 NOTE — ASSESSMENT
[FreeTextEntry1] : Ms. 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  The patient's blood pressure presently at goal.  I have reviewed the notes from Dr. Nicholson as well as ultrasound results from 2018 as well as 2019.  Left kidney size seems to be getting smaller.  Repeated doppler in 2022 showed stability.  We reviewed the importance of good blood pressure control.  She is doing very well on her current regimen.  Her episodes of dizzyness as described above may represent episodes of transiet hypotension or pre-syncope.  No murmurs or bruit on exam today.  I recommended that she decrease HCTZ dose to 25 mg po daily.  I stressed that she should follow up with cardiology to review symptoms as well.  Monitor home BP trend.\par \par f/u in six months.

## 2022-11-14 NOTE — REVIEW OF SYSTEMS
[Fever] : no fever [Chills] : no chills [Feeling Poorly] : not feeling poorly [Feeling Tired] : not feeling tired [Eyesight Problems] : no eyesight problems [Nosebleeds] : no nosebleeds [Chest Pain] : no chest pain [Lower Ext Edema] : no lower extremity edema [Shortness Of Breath] : no shortness of breath [Wheezing] : no wheezing [Cough] : no cough [SOB on Exertion] : no shortness of breath during exertion [Abdominal Pain] : no abdominal pain [Vomiting] : no vomiting [Dysuria] : no dysuria [Incontinence] : no incontinence [Arthralgias] : no arthralgias [Itching] : no itching [As Noted in HPI] : as noted in HPI [Dizziness] : dizziness [Fainting] : no fainting [Anxiety] : no anxiety [Depression] : no depression [Easy Bleeding] : no tendency for easy bleeding [Easy Bruising] : no tendency for easy bruising

## 2022-11-14 NOTE — PHYSICAL EXAM
[General Appearance - Alert] : alert [General Appearance - In No Acute Distress] : in no acute distress [General Appearance - Well Nourished] : well nourished [Examination Of The Oral Cavity] : the lips and gums were normal [Neck Appearance] : the appearance of the neck was normal [Neck Cervical Mass (___cm)] : no neck mass was observed [Jugular Venous Distention Increased] : there was no jugular-venous distention [Thyroid Nodule] : there were no palpable thyroid nodules [Auscultation Breath Sounds / Voice Sounds] : lungs were clear to auscultation bilaterally [Heart Sounds] : normal S1 and S2 [Murmurs] : no murmurs [Arterial Pulses Carotid] : carotid pulses were normal with no bruits [Edema] : there was no peripheral edema [No CVA Tenderness] : no ~M costovertebral angle tenderness [Abnormal Walk] : normal gait [Involuntary Movements] : no involuntary movements were seen [Skin Color & Pigmentation] : normal skin color and pigmentation [Skin Turgor] : normal skin turgor [] : no rash [Oriented To Time, Place, And Person] : oriented to person, place, and time [Impaired Insight] : insight and judgment were intact [Affect] : the affect was normal

## 2022-11-21 ENCOUNTER — NON-APPOINTMENT (OUTPATIENT)
Age: 82
End: 2022-11-21

## 2022-11-21 ENCOUNTER — APPOINTMENT (OUTPATIENT)
Dept: CARDIOLOGY | Facility: CLINIC | Age: 82
End: 2022-11-21

## 2022-11-21 VITALS
DIASTOLIC BLOOD PRESSURE: 71 MMHG | SYSTOLIC BLOOD PRESSURE: 124 MMHG | OXYGEN SATURATION: 100 % | BODY MASS INDEX: 23.3 KG/M2 | HEART RATE: 81 BPM | WEIGHT: 140 LBS

## 2022-11-21 PROCEDURE — 99214 OFFICE O/P EST MOD 30 MIN: CPT

## 2022-11-21 PROCEDURE — 93000 ELECTROCARDIOGRAM COMPLETE: CPT

## 2022-11-21 NOTE — HISTORY OF PRESENT ILLNESS
[FreeTextEntry1] : The patient is a 82 year-old white woman with known past medical history of hypertension, dyslipidemia, and fibromuscular dysplasia (FMD), presents today for evaluation.\par She was diagnosed with FMD when she wanted to start OCP but was told her blood pressure was too high. She had unilateral angioplasty.\par She is maintained on losartan, HCTZ, and amlodipine. She has normal renal function.\par \par One episode of syncope in her life, during her first pregnancy at age 20. No syncope since, but occasional presyncope. \par \par [place] in [place], [place] and [place].\par Patient has three children. The oldest was born when she was almost 21.\par Two children were born when she was living in [place] in the late 1970s.\par She went back to school in the 1990s. Bachelors in 1996, then two masters degrees. Got her PhD at age 77.

## 2022-11-21 NOTE — DISCUSSION/SUMMARY
[EKG obtained to assist in diagnosis and management of assessed problem(s)] : EKG obtained to assist in diagnosis and management of assessed problem(s) [FreeTextEntry1] : The patient is is a 82 year-old woman with cardiovascular risk factors as above including hypertension, dyslipidemia, and fibromuscular dysplasia status post unilateral renal angioplasty in remote past, presents today for evaluation.\par The blood pressure is well controlled today\par The patient was told about meclizine but she is does not wish to start another pill now.  She was told to follow-up with an ear nose and throat doctor about the vertigo.  She was told about the Epley maneuver.  No new medications were prescribed at today's visit.  Her EKG today was normal.  She will return as needed.  Total time spent on the day of the encounter was 34 minutes which includes  face-to-face and non face-to-face times personally spent by the physician preparing to see the patient, obtaining  separately obtained history, performing a medically appropriate exam and evaluation, counseling,  educating, talking to the family or caregivers, ordering medicines, ordering tests or procedures, referring and communicating with other healthcare  professionals, and documenting clinical information in the electronic health record. We will send her for carotid dopplers and an Echocardiogram.

## 2022-11-21 NOTE — REASON FOR VISIT
[Arrhythmia/ECG Abnorrmalities] : arrhythmia/ECG abnormalities [FreeTextEntry1] : November 1, 2021: The patient comes in describing a dizziness with the room spinning around when she lays down and turns her head to the right.  This has been happening intermittently over the past several months.  She denies any chest pains, shortness of breath, or palpitations.\par 11/21/22: Lightheadedness but it is better since lowering her HCTZ

## 2022-11-29 NOTE — PRE-ANESTHESIA EVALUATION ADULT - NSANTHTOBACCOSD_GEN_ALL_CORE
February 8, 2017      Olga Rodriguez     [address] [place]  [address]      Dear Olga Rodriguez,    We hope this letter finds you well.  We are writing to inform you that you missed your follow-up visit on Friday, January 20, 2017. Follow-up visits are an important part of your subsequent health care.   We ask that you contact us at [phone] to reschedule your appointment, to let us know that you are following up with another physician and will not be returning to our office, or of any other circumstances arose as to why you will not be returning to see the Medical Oncologist. Dr. De Leon          We wish you the very best health.    Sincerely,    [facility]         Katalina Dial RN  11/28/22 1926 No

## 2022-12-14 ENCOUNTER — APPOINTMENT (OUTPATIENT)
Dept: CARDIOLOGY | Facility: CLINIC | Age: 82
End: 2022-12-14

## 2022-12-14 DIAGNOSIS — I34.0 NONRHEUMATIC MITRAL (VALVE) INSUFFICIENCY: ICD-10-CM

## 2022-12-14 DIAGNOSIS — I77.9 DISORDER OF ARTERIES AND ARTERIOLES, UNSPECIFIED: ICD-10-CM

## 2022-12-14 DIAGNOSIS — I34.81 NONRHEUMATIC MITRAL (VALVE) ANNULUS CALCIFICATION: ICD-10-CM

## 2022-12-14 PROCEDURE — 93880 EXTRACRANIAL BILAT STUDY: CPT

## 2022-12-14 PROCEDURE — 93306 TTE W/DOPPLER COMPLETE: CPT

## 2022-12-15 PROBLEM — I77.9 BILATERAL CAROTID ARTERY DISEASE, UNSPECIFIED TYPE: Status: ACTIVE | Noted: 2022-12-15

## 2022-12-15 PROBLEM — I34.0 MILD MITRAL REGURGITATION: Status: ACTIVE | Noted: 2022-12-15

## 2023-02-08 ENCOUNTER — NON-APPOINTMENT (OUTPATIENT)
Age: 83
End: 2023-02-08

## 2023-03-21 ENCOUNTER — NON-APPOINTMENT (OUTPATIENT)
Age: 83
End: 2023-03-21

## 2023-04-03 ENCOUNTER — RX RENEWAL (OUTPATIENT)
Age: 83
End: 2023-04-03

## 2023-04-03 ENCOUNTER — NON-APPOINTMENT (OUTPATIENT)
Age: 83
End: 2023-04-03

## 2023-04-08 ENCOUNTER — NON-APPOINTMENT (OUTPATIENT)
Age: 83
End: 2023-04-08

## 2023-04-13 ENCOUNTER — NON-APPOINTMENT (OUTPATIENT)
Age: 83
End: 2023-04-13

## 2023-04-13 ENCOUNTER — APPOINTMENT (OUTPATIENT)
Dept: CARDIOLOGY | Facility: CLINIC | Age: 83
End: 2023-04-13
Payer: MEDICARE

## 2023-04-13 VITALS
HEART RATE: 70 BPM | OXYGEN SATURATION: 97 % | WEIGHT: 145 LBS | BODY MASS INDEX: 24.16 KG/M2 | DIASTOLIC BLOOD PRESSURE: 82 MMHG | HEIGHT: 65 IN | SYSTOLIC BLOOD PRESSURE: 132 MMHG

## 2023-04-13 DIAGNOSIS — R42 DIZZINESS AND GIDDINESS: ICD-10-CM

## 2023-04-13 DIAGNOSIS — N18.30 CHRONIC KIDNEY DISEASE, STAGE 3 UNSPECIFIED: ICD-10-CM

## 2023-04-13 PROCEDURE — 99214 OFFICE O/P EST MOD 30 MIN: CPT

## 2023-04-13 PROCEDURE — 93000 ELECTROCARDIOGRAM COMPLETE: CPT

## 2023-04-13 NOTE — DISCUSSION/SUMMARY
[FreeTextEntry1] : The patient is is a 82 year-old woman with cardiovascular risk factors as above including hypertension, dyslipidemia, and fibromuscular dysplasia status post unilateral renal angioplasty in remote past, presents today for evaluation.\par The blood pressure is well controlled today\par The patient was told about meclizine but she is does not wish to start another pill now.  She was told to follow-up with an ear nose and throat doctor about the vertigo.  She was told about the Epley maneuver.  No new medications were prescribed at today's visit.  Her EKG today was normal.  She will return as needed.  Total time spent on the day of the encounter was 34 minutes which includes  face-to-face and non face-to-face times personally spent by the physician preparing to see the patient, obtaining  separately obtained history, performing a medically appropriate exam and evaluation, counseling,  educating, talking to the family or caregivers, ordering medicines, ordering tests or procedures, referring and communicating with other healthcare  professionals, and documenting clinical information in the electronic health record.  The patient was told to hydrate better in the morning especially on warm days.

## 2023-04-13 NOTE — HISTORY OF PRESENT ILLNESS
[FreeTextEntry1] : The patient is a 82 year-old white woman with known past medical history of hypertension, dyslipidemia, and fibromuscular dysplasia (FMD), presents today for evaluation.\par She was diagnosed with FMD when she wanted to start OCP but was told her blood pressure was too high. She had unilateral angioplasty.\par She is maintained on losartan, HCTZ, and amlodipine. She has normal renal function.\par April 13, 2023: The patient was on vacation to [place] and has not was going up stairs and then she got lightheaded and felt weak.  She admits to not hydrating well in the morning.  She denies any other symptoms.  5 months ago she had an echocardiogram which showed mild mitral regurgitation with a normal LV ejection fraction.  She had carotids which showed less than 50% narrowings.\par One episode of syncope in her life, during her first pregnancy at age 20. No syncope since, but occasional presyncope. \par \par Born in [place], [place].\par Patient has three children. The oldest was born when she was almost 21.\par Two children were born when she was living in [place] in the late 1970s.\par She went back to school in the 1990s. Bachelors in 1996, then two masters degrees. Got her PhD at age 77.

## 2023-04-17 ENCOUNTER — APPOINTMENT (OUTPATIENT)
Dept: PSYCHIATRY | Facility: CLINIC | Age: 83
End: 2023-04-17

## 2023-05-01 NOTE — H&P PST ADULT - FALLEN IN THE PAST
Pt called at this time. 2 identifiers confirmed. Per Dr. Kilo Hogan:  no kidney or gall stones. No organ abnormality except slight liver and spleen enlargement likely due to fatty liver. Normal blood flow through the liver. LFT wnl but should see Hepatoligist at [address] [place] [address] for enlarged liver and spleen -may need adidtional studies fibroscan, etc, weight reduction recommended if fatty liver. No findings to cause flank pain which is likely strain. Verbalized understanding. Xtraice message sent to pt per request. Given number to [facility]. no

## 2023-05-11 ENCOUNTER — RX RENEWAL (OUTPATIENT)
Age: 83
End: 2023-05-11

## 2023-05-17 ENCOUNTER — OUTPATIENT (OUTPATIENT)
Dept: OUTPATIENT SERVICES | Facility: HOSPITAL | Age: 83
LOS: 1 days | Discharge: HOME | End: 2023-05-17

## 2023-05-17 DIAGNOSIS — D05.90 UNSPECIFIED TYPE OF CARCINOMA IN SITU OF UNSPECIFIED BREAST: ICD-10-CM

## 2023-05-19 ENCOUNTER — APPOINTMENT (OUTPATIENT)
Dept: INTERNAL MEDICINE | Facility: CLINIC | Age: 83
End: 2023-05-19
Payer: MEDICARE

## 2023-05-19 VITALS
OXYGEN SATURATION: 99 % | WEIGHT: 148 LBS | HEIGHT: 60 IN | BODY MASS INDEX: 29.06 KG/M2 | SYSTOLIC BLOOD PRESSURE: 129 MMHG | DIASTOLIC BLOOD PRESSURE: 76 MMHG | TEMPERATURE: 97.9 F | RESPIRATION RATE: 16 BRPM | HEART RATE: 75 BPM

## 2023-05-19 DIAGNOSIS — Z00.00 ENCOUNTER FOR GENERAL ADULT MEDICAL EXAMINATION W/OUT ABNORMAL FINDINGS: ICD-10-CM

## 2023-05-19 DIAGNOSIS — R73.09 OTHER ABNORMAL GLUCOSE: ICD-10-CM

## 2023-05-19 DIAGNOSIS — R73.03 PREDIABETES.: ICD-10-CM

## 2023-05-19 DIAGNOSIS — R25.2 CRAMP AND SPASM: ICD-10-CM

## 2023-05-19 PROCEDURE — G0439: CPT

## 2023-05-19 RX ORDER — MAGNESIUM GLUCONATE 27 MG(500)
500 TABLET ORAL DAILY
Refills: 0 | Status: DISCONTINUED | COMMUNITY
Start: 2022-06-02 | End: 2023-05-19

## 2023-05-19 RX ORDER — LACTOBACILLUS ACIDOPHILUS/PECT 30 MG-20MG
TABLET ORAL
Refills: 0 | Status: ACTIVE | COMMUNITY

## 2023-05-19 RX ORDER — ONDANSETRON 4 MG/1
4 TABLET, ORALLY DISINTEGRATING ORAL
Refills: 0 | Status: DISCONTINUED | COMMUNITY
Start: 2022-09-27 | End: 2023-05-19

## 2023-05-25 LAB
ALBUMIN SERPL ELPH-MCNC: 4.2 G/DL
ALP BLD-CCNC: 65 U/L
ALT SERPL-CCNC: 12 U/L
ANION GAP SERPL CALC-SCNC: 12 MMOL/L
AST SERPL-CCNC: 18 U/L
BILIRUB SERPL-MCNC: 0.3 MG/DL
BUN SERPL-MCNC: 23 MG/DL
CALCIUM SERPL-MCNC: 9.5 MG/DL
CHLORIDE SERPL-SCNC: 104 MMOL/L
CHOLEST SERPL-MCNC: 152 MG/DL
CO2 SERPL-SCNC: 27 MMOL/L
CREAT SERPL-MCNC: 1.1 MG/DL
EGFR: 51 ML/MIN/1.73M2
ESTIMATED AVERAGE GLUCOSE: 114 MG/DL
GLUCOSE SERPL-MCNC: 107 MG/DL
HBA1C MFR BLD HPLC: 5.6 %
HCT VFR BLD CALC: 40.3 %
HDLC SERPL-MCNC: 76 MG/DL
HGB BLD-MCNC: 12.8 G/DL
LDLC SERPL CALC-MCNC: 58 MG/DL
MCHC RBC-ENTMCNC: 30.7 PG
MCHC RBC-ENTMCNC: 31.8 GM/DL
MCV RBC AUTO: 96.6 FL
NONHDLC SERPL-MCNC: 76 MG/DL
PLATELET # BLD AUTO: 214 K/UL
POTASSIUM SERPL-SCNC: 3.9 MMOL/L
PROT SERPL-MCNC: 6.4 G/DL
RBC # BLD: 4.17 M/UL
RBC # FLD: 12.6 %
SODIUM SERPL-SCNC: 143 MMOL/L
TRIGL SERPL-MCNC: 89 MG/DL
TSH SERPL-ACNC: 1.66 UIU/ML
WBC # FLD AUTO: 4.85 K/UL

## 2023-06-02 ENCOUNTER — OUTPATIENT (OUTPATIENT)
Dept: OUTPATIENT SERVICES | Facility: HOSPITAL | Age: 83
LOS: 1 days | End: 2023-06-02
Payer: MEDICARE

## 2023-06-02 ENCOUNTER — RESULT REVIEW (OUTPATIENT)
Age: 83
End: 2023-06-02

## 2023-06-02 ENCOUNTER — APPOINTMENT (OUTPATIENT)
Dept: MAMMOGRAPHY | Facility: CLINIC | Age: 83
End: 2023-06-02
Payer: MEDICARE

## 2023-06-02 DIAGNOSIS — D05.10 INTRADUCTAL CARCINOMA IN SITU OF UNSPECIFIED BREAST: ICD-10-CM

## 2023-06-02 PROCEDURE — 77066 DX MAMMO INCL CAD BI: CPT

## 2023-06-02 PROCEDURE — 77066 DX MAMMO INCL CAD BI: CPT | Mod: 26

## 2023-06-02 PROCEDURE — G0279: CPT | Mod: 26

## 2023-06-02 PROCEDURE — G0279: CPT

## 2023-06-09 ENCOUNTER — APPOINTMENT (OUTPATIENT)
Dept: HEMATOLOGY ONCOLOGY | Facility: CLINIC | Age: 83
End: 2023-06-09
Payer: MEDICARE

## 2023-06-09 VITALS
OXYGEN SATURATION: 97 % | SYSTOLIC BLOOD PRESSURE: 159 MMHG | HEART RATE: 70 BPM | HEIGHT: 60 IN | BODY MASS INDEX: 29.06 KG/M2 | DIASTOLIC BLOOD PRESSURE: 80 MMHG | RESPIRATION RATE: 17 BRPM | WEIGHT: 148 LBS

## 2023-06-09 DIAGNOSIS — F41.9 ANXIETY DISORDER, UNSPECIFIED: ICD-10-CM

## 2023-06-09 PROCEDURE — 99214 OFFICE O/P EST MOD 30 MIN: CPT

## 2023-06-09 NOTE — REVIEW OF SYSTEMS
[Anxiety] : anxiety [Depression] : depression [Negative] : Allergic/Immunologic [de-identified] : no SI or HI

## 2023-06-09 NOTE — PHYSICAL EXAM
[Fully active, able to carry on all pre-disease performance without restriction] : Status 0 - Fully active, able to carry on all pre-disease performance without restriction [Normal] : affect appropriate [de-identified] : right lumpectomy scar in the inferior portion of the breast, no mass; left breast no mass, no adenopathy b/l

## 2023-06-09 NOTE — HISTORY OF PRESENT ILLNESS
[Disease: _____________________] : Disease: [unfilled] [T: ___] : T[unfilled] [N: ___] : N[unfilled] [M: ___] : M[unfilled] [AJCC Stage: ____] : AJCC Stage: [unfilled] [de-identified] : Patient had screening mammogram on 01/24/19 that showed a mass in the lower inner quadrant of the right breast; BiRADS: 0. Follow-up mammo on 01/29/19 showed a 10 mm irregular focal asymmetry of the posterior lower central right breast. US of the right breast on 01/29/19 showed no sonographic evidence of malignancy. 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 ER>95%, GA >95%\par \par Right partial mastectomy on 04/24/19 Dr. Demetrius Mendes: DCIS intermediate grade, 1.3cm, <1mm margin anterior, TisNx\par \par 5/2/19 last seen by Dr. Mendes - per documentation told "margin is close his the anterior margin was right under the skin is nothing more that I can do to clear that area." Referred to medical oncology at that time. [de-identified] : DCIS, int nuclear grade, ER+[place]+ [de-identified] : 6/9/2023\par Patient presents today to assess for evidence of breast cancer recurrence and assess treatment toxicity.\par Started on anastrozole (10/2019)with severe joint pains, subsequently switched to exemestane 9/17/2020-2/19/2021\par Tamoxifen was then recommended on 2/19/2021 but patient delayed starting until 6/1/2021; has tolerated well\par Patient denies any breast masses, breast tenderness, skin changes or nipple discharge.\par c/o anxiety and stress related to personal life factors, including living alone, working and family - she is eager to meet with a psychologist/therapist, but is having trouble finding someone who accepts new patients and accepts medicare\par Patient denies any SOB, CP, abdominal pain, bone pain, headache, or unexplained weight loss\par Patient denies any hotflashes, arthralgias, vaginal dryness, vaginal bleeding, hair loss, muscle cramps.\par \par \par Mammo Only (fatty breast tissue) - 6/2023 - BIRADS2\par \par HEALTH MAINTENANCE\par PCP Dr. Velarde, PMD Dr. Perez last physical 4/21/2021 HTN 2/2 fibromuscular dysplasia, HLD, rec to see cardiologist Dr. Nicholson 6/1 for abnormal EKG\par Last eye exam Dr. Tomas 4/22/2021, cataracts beginning\par Colonoscopy 4/2019, no polyps, no repeat\par Last GYN , no vaginal spotting or bleeding, LMP age 44.  - sees annually \par Last derm exam Dr. Daugherty, no h/o skin cancer, h/o skin biopsies\par \par Nephrology Dr. Kenny, renal Fibromuscular dysplasia s/p unilateral angioplasty - \par DEXA 2019, femoral neck -2.3 c/w osteopenia\par DEXA 5/2022, femoral neck -2.5 c/w osteoporosis; referred to Endocrine and seen by Dr. 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 She travels frequently and extensively.\par COVID vaccine MODERNA 2nd vaccine 3/16/2021

## 2023-06-09 NOTE — ASSESSMENT
[FreeTextEntry1] : 81 yo woman with history of Right breast ER+GA+ DCIS s/p lumpectomy initially treated with AI between 10/2019 and 2/2021 but could not tolerate due to severe joint swelling; changed to tamoxifen which she ultimately started in 6/2021\par \par -up to date with mammo with last studies in 6/2023; continue annual screenings with mammograph - predominately fatty tissue, breast US not medically necessary\par - to continue endocrine therapy total 5 years 10/2024\par -clinically MALINA\par -labs reviewed from 5/19/2023 with stable chem panel\par - discussed findings on DEXA scan from 5/2022; patient seen by endocrine but declining any pharmacologic therapy; encouraged weight bearing exercise\par - Patient had the opportunity to have all their questions answered to their satisfaction \par - f/u in 6 months\par \par Anxiety/Depression\par - no SI, HI\par - emailed psycho-oncology and breast navigation to help patient connect with mental health professionals\par - she does not wish to take medication, but prefers cognitive therapy.\par

## 2023-06-12 ENCOUNTER — APPOINTMENT (OUTPATIENT)
Dept: NEPHROLOGY | Facility: CLINIC | Age: 83
End: 2023-06-12

## 2023-06-16 ENCOUNTER — APPOINTMENT (OUTPATIENT)
Dept: NEPHROLOGY | Facility: CLINIC | Age: 83
End: 2023-06-16
Payer: MEDICARE

## 2023-06-16 VITALS
SYSTOLIC BLOOD PRESSURE: 122 MMHG | HEART RATE: 83 BPM | DIASTOLIC BLOOD PRESSURE: 72 MMHG | OXYGEN SATURATION: 98 % | TEMPERATURE: 97.1 F

## 2023-06-16 PROCEDURE — 99213 OFFICE O/P EST LOW 20 MIN: CPT

## 2023-06-16 NOTE — PHYSICAL EXAM
[General Appearance - Alert] : alert [General Appearance - In No Acute Distress] : in no acute distress [General Appearance - Well Nourished] : well nourished [Examination Of The Oral Cavity] : the lips and gums were normal [Neck Appearance] : the appearance of the neck was normal [Neck Cervical Mass (___cm)] : no neck mass was observed [Jugular Venous Distention Increased] : there was no jugular-venous distention [Thyroid Nodule] : there were no palpable thyroid nodules [Auscultation Breath Sounds / Voice Sounds] : lungs were clear to auscultation bilaterally [Heart Sounds] : normal S1 and S2 [Murmurs] : no murmurs [Arterial Pulses Carotid] : carotid pulses were normal with no bruits [Edema] : there was no peripheral edema [No CVA Tenderness] : no ~M costovertebral angle tenderness [Involuntary Movements] : no involuntary movements were seen [Abnormal Walk] : normal gait [Skin Turgor] : normal skin turgor [Skin Color & Pigmentation] : normal skin color and pigmentation [] : no rash [Oriented To Time, Place, And Person] : oriented to person, place, and time [Impaired Insight] : insight and judgment were intact [Affect] : the affect was normal

## 2023-06-16 NOTE — HISTORY OF PRESENT ILLNESS
[FreeTextEntry1] : Today I had the pleasure of meeting Charo Chavez.  She is a 78 years old woman with an extraordinary medical and personal history.  Although she is [place] she spent the early part of her life with her  and young son traveling and backpacking across the entire world.  She has a history of bilateral fibromuscular dysplasia and is s/p unilateral angioplasty.  Her blood pressure has been well controlled over the years and recently she's been doing well on hctz 25 and losartan 100.  Of late her blood pressure has been rising.  She reports that she just returned from her trip to [place] and she had a great time there.  Since returning she changed her diet around a lot and has lost weight.  Last night she went out to eat with her daughter's boss and had an extraordinarily salty meal.\par \par 2/1/19 -- Since our last meeting Charo has had quite a few positive things in her personal / professional life.  She has been asked to help author a book, she has been asked to speak.  Overall doing quite well.  She had an abnormal mammogram recently and so she has to go and get a breast biopsy.  Her blood pressure has been slightly on the higher side, still.  A repeat doppler done after last visit did not show any evidence of FMD.\par \par 1/6/2020 -- Since our last visit Mikhail had a breast exiscion.  She also had a problem with a paraesophageal hernia.  She had it repaired about three weeks ago and has subsequently been on a liquid diet.  She's had a couple of episodes of dysphagia which caused her to regurgitate.  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  She feels less active during the pandemic.  Her blood pressure has been elevated consistently.  Otherwise denies complaints today.\par \par 6/14/21 -- Charo has been doing well since our last visit.  She has been going to a number of doctors for her various issues.  She is planning a trip this summer.  BP has been doing well.\par \par 2/10/22 -- Charo has continued to work.  Her main complaint is that she has difficulty with poor appetite particularly since starting tamoxifen.  She has no chest pain no shortness of breath.\par \par 11/14/22 -- Charo has been doing well since our last visit although she notes that of late she has been having intermittent episodes that she describes as a muscle weakness and lightheaded-ness.  It tends to come on suddenly and is associated with diaphoresis.  It is alleviated by sitting down and resting.  She reports that her blood pressure at home has been on the lower side.  \par \par 6/16/23 -- Charo is doing well no chest pain no shortness of breath.  gets dizzy still at times but ok at the moment.

## 2023-06-16 NOTE — ASSESSMENT
[FreeTextEntry1] : Ms. 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  The patient's blood pressure presently at goal.  I have reviewed the notes from Dr. Nicholson as well as ultrasound results from 2018 as well as 2019.  Left kidney size seems to be getting smaller.  Repeated doppler in 2022 showed stability.  We reviewed the importance of good blood pressure control.  She is doing very well on her current regimen.  Her episodes of dizzyness as described above may represent episodes of transiet hypotension or pre-syncope previously reduced her HCTZ..  No murmurs or bruit on exam today.  Discussed "medication holiday" / "Sick day rule" for periods of dehydration / dizzyness.  \par \par f/u in six months.

## 2023-06-23 ENCOUNTER — APPOINTMENT (OUTPATIENT)
Dept: INTERNAL MEDICINE | Facility: CLINIC | Age: 83
End: 2023-06-23
Payer: MEDICARE

## 2023-06-23 DIAGNOSIS — I10 ESSENTIAL (PRIMARY) HYPERTENSION: ICD-10-CM

## 2023-06-23 DIAGNOSIS — H26.9 UNSPECIFIED CATARACT: ICD-10-CM

## 2023-06-23 DIAGNOSIS — Z01.818 ENCOUNTER FOR OTHER PREPROCEDURAL EXAMINATION: ICD-10-CM

## 2023-06-23 PROCEDURE — 99213 OFFICE O/P EST LOW 20 MIN: CPT | Mod: 95

## 2023-08-10 ENCOUNTER — RX RENEWAL (OUTPATIENT)
Age: 83
End: 2023-08-10

## 2023-08-28 ENCOUNTER — RX RENEWAL (OUTPATIENT)
Age: 83
End: 2023-08-28

## 2023-09-19 ENCOUNTER — APPOINTMENT (OUTPATIENT)
Dept: ORTHOPEDIC SURGERY | Facility: CLINIC | Age: 83
End: 2023-09-19

## 2023-09-19 LAB
M TB IFN-G BLD-IMP: NEGATIVE
QUANTIFERON TB PLUS MITOGEN MINUS NIL: 4.7 IU/ML
QUANTIFERON TB PLUS NIL: 0.02 IU/ML
QUANTIFERON TB PLUS TB1 MINUS NIL: 0 IU/ML
QUANTIFERON TB PLUS TB2 MINUS NIL: 0 IU/ML

## 2023-09-22 ENCOUNTER — NON-APPOINTMENT (OUTPATIENT)
Age: 83
End: 2023-09-22

## 2023-09-26 ENCOUNTER — RX RENEWAL (OUTPATIENT)
Age: 83
End: 2023-09-26

## 2023-09-26 RX ORDER — HYDROCHLOROTHIAZIDE 50 MG/1
50 TABLET ORAL
Qty: 90 | Refills: 2 | Status: ACTIVE | COMMUNITY
Start: 2022-08-29 | End: 1900-01-01

## 2023-09-27 ENCOUNTER — APPOINTMENT (OUTPATIENT)
Dept: ORTHOPEDIC SURGERY | Facility: CLINIC | Age: 83
End: 2023-09-27
Payer: MEDICARE

## 2023-09-27 VITALS — WEIGHT: 150 LBS | BODY MASS INDEX: 24.99 KG/M2 | HEIGHT: 65 IN

## 2023-09-27 DIAGNOSIS — M47.816 SPONDYLOSIS W/OUT MYELOPATHY OR RADICULOPATHY, LUMBAR REGION: ICD-10-CM

## 2023-09-27 PROCEDURE — 72100 X-RAY EXAM L-S SPINE 2/3 VWS: CPT

## 2023-09-27 PROCEDURE — 99213 OFFICE O/P EST LOW 20 MIN: CPT

## 2023-09-27 PROCEDURE — 73523 X-RAY EXAM HIPS BI 5/> VIEWS: CPT

## 2023-10-01 PROBLEM — I34.81 MITRAL ANNULAR CALCIFICATION: Status: ACTIVE | Noted: 2022-12-15

## 2023-11-06 RX ORDER — TAMOXIFEN CITRATE 20 MG/1
20 TABLET, FILM COATED ORAL DAILY
Qty: 90 | Refills: 0 | Status: ACTIVE | COMMUNITY
Start: 2021-02-19 | End: 1900-01-01

## 2023-11-14 ENCOUNTER — NON-APPOINTMENT (OUTPATIENT)
Age: 83
End: 2023-11-14

## 2023-11-28 ENCOUNTER — RX RENEWAL (OUTPATIENT)
Age: 83
End: 2023-11-28

## 2023-12-11 ENCOUNTER — OUTPATIENT (OUTPATIENT)
Dept: OUTPATIENT SERVICES | Facility: HOSPITAL | Age: 83
LOS: 1 days | Discharge: HOME | End: 2023-12-11

## 2023-12-11 DIAGNOSIS — D05.90 UNSPECIFIED TYPE OF CARCINOMA IN SITU OF UNSPECIFIED BREAST: ICD-10-CM

## 2023-12-12 ENCOUNTER — APPOINTMENT (OUTPATIENT)
Dept: HEMATOLOGY ONCOLOGY | Facility: CLINIC | Age: 83
End: 2023-12-12
Payer: MEDICARE

## 2023-12-12 VITALS
SYSTOLIC BLOOD PRESSURE: 133 MMHG | OXYGEN SATURATION: 98 % | HEART RATE: 88 BPM | DIASTOLIC BLOOD PRESSURE: 80 MMHG | WEIGHT: 150.13 LBS | RESPIRATION RATE: 16 BRPM | TEMPERATURE: 97.3 F | BODY MASS INDEX: 24.98 KG/M2

## 2023-12-12 DIAGNOSIS — Z79.899 OTHER LONG TERM (CURRENT) DRUG THERAPY: ICD-10-CM

## 2023-12-12 DIAGNOSIS — D05.10 INTRADUCTAL CARCINOMA IN SITU OF UNSPECIFIED BREAST: ICD-10-CM

## 2023-12-12 DIAGNOSIS — M81.0 AGE-RELATED OSTEOPOROSIS W/OUT CURRENT PATHOLOGICAL FRACTURE: ICD-10-CM

## 2023-12-12 PROCEDURE — 99214 OFFICE O/P EST MOD 30 MIN: CPT

## 2023-12-25 ENCOUNTER — RX RENEWAL (OUTPATIENT)
Age: 83
End: 2023-12-25

## 2023-12-25 RX ORDER — AMLODIPINE BESYLATE 5 MG/1
5 TABLET ORAL DAILY
Qty: 90 | Refills: 2 | Status: ACTIVE | COMMUNITY
Start: 2020-12-21 | End: 1900-01-01
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
## 2023-12-25 NOTE — ASSESSMENT
[FreeTextEntry1] : 84 yo woman with history of Right breast ER+WY+ DCIS s/p lumpectomy initially treated with AI between 10/2019 and 2/2021 but could not tolerate due to severe joint swelling; changed to tamoxifen which she ultimately started in 6/2021  -up to date with mammo with last studies in 6/2023; continue annual screenings with mammograph - predominately fatty tissue, breast US not medically necessary  - to continue endocrine therapy total 5 years 10/2024  -clinically MALINA  -labs reviewed from 5/19/2023 with stable chem panel  - discussed findings on DEXA scan from 5/2022; patient seen by endocrine but declining any pharmacologic therapy; encouraged weight bearing exercise  - Patient had the opportunity to have all their questions answered to their satisfaction  - f/u in 6 months; considering moving to [place]    Anxiety/Depression  - no SI, HI  - emailed psycho-oncology and breast navigation to help patient connect with mental health professionals  - she does not wish to take medication, but prefers cognitive therapy.

## 2023-12-25 NOTE — PHYSICAL EXAM
[de-identified] : right lumpectomy scar in the inferior portion of the breast, no mass; left breast no mass, no adenopathy b/l

## 2024-02-02 ENCOUNTER — APPOINTMENT (OUTPATIENT)
Dept: NEPHROLOGY | Facility: CLINIC | Age: 84
End: 2024-02-02
Payer: MEDICARE

## 2024-02-02 VITALS
TEMPERATURE: 97.7 F | DIASTOLIC BLOOD PRESSURE: 66 MMHG | OXYGEN SATURATION: 98 % | BODY MASS INDEX: 24.99 KG/M2 | WEIGHT: 150 LBS | SYSTOLIC BLOOD PRESSURE: 111 MMHG | HEIGHT: 65 IN | HEART RATE: 84 BPM

## 2024-02-02 PROCEDURE — 99213 OFFICE O/P EST LOW 20 MIN: CPT

## 2024-02-02 NOTE — PHYSICAL EXAM
[General Appearance - Alert] : alert [General Appearance - In No Acute Distress] : in no acute distress [General Appearance - Well Nourished] : well nourished [Examination Of The Oral Cavity] : the lips and gums were normal [Neck Cervical Mass (___cm)] : no neck mass was observed [Jugular Venous Distention Increased] : there was no jugular-venous distention [Auscultation Breath Sounds / Voice Sounds] : lungs were clear to auscultation bilaterally [Heart Sounds] : normal S1 and S2 [Murmurs] : no murmurs [Arterial Pulses Carotid] : carotid pulses were normal with no bruits [Edema] : there was no peripheral edema [No CVA Tenderness] : no ~M costovertebral angle tenderness [Abnormal Walk] : normal gait [Involuntary Movements] : no involuntary movements were seen [Skin Color & Pigmentation] : normal skin color and pigmentation [Skin Turgor] : normal skin turgor [] : no rash [Oriented To Time, Place, And Person] : oriented to person, place, and time [Impaired Insight] : insight and judgment were intact [Affect] : the affect was normal

## 2024-02-02 NOTE — HISTORY OF PRESENT ILLNESS
[FreeTextEntry1] : Today I had the pleasure of meeting Charo Chavez.  She is a 78 years old woman with an extraordinary medical and personal history.  Although she is [place] she spent the early part of her life with her  and young son traveling and backpacking across the entire world.  She has a history of bilateral fibromuscular dysplasia and is s/p unilateral angioplasty.  Her blood pressure has been well controlled over the years and recently she's been doing well on hctz 25 and losartan 100.  Of late her blood pressure has been rising.  She reports that she just returned from her trip to [place] and she had a great time there.  Since returning she changed her diet around a lot and has lost weight.  Last night she went out to eat with her daughter's boss and had an extraordinarily salty meal.  2/1/19 -- Since our last meeting Charo has had quite a few positive things in her personal / professional life.  She has been asked to help author a book, she has been asked to speak.  Overall doing quite well.  She had an abnormal mammogram recently and so she has to go and get a breast biopsy.  Her blood pressure has been slightly on the higher side, still.  A repeat doppler done after last visit did not show any evidence of FMD.  1/6/2020 -- Since our last visit Mikhail had a breast exiscion.  She also had a problem with a paraesophageal hernia.  She had it repaired about three weeks ago and has subsequently been on a liquid diet.  She's had a couple of episodes of dysphagia which caused her to regurgitate.  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  12/10/20  -- Since our last visit Charo is doing ok.  She feels less active during the pandemic.  Her blood pressure has been elevated consistently.  Otherwise denies complaints today.  6/14/21 -- Charo has been doing well since our last visit.  She has been going to a number of doctors for her various issues.  She is planning a trip this summer.  BP has been doing well.  2/10/22 -- Charo has continued to work.  Her main complaint is that she has difficulty with poor appetite particularly since starting tamoxifen.  She has no chest pain no shortness of breath.  11/14/22 -- Charo has been doing well since our last visit although she notes that of late she has been having intermittent episodes that she describes as a muscle weakness and lightheaded-ness.  It tends to come on suddenly and is associated with diaphoresis.  It is alleviated by sitting down and resting.  She reports that her blood pressure at home has been on the lower side.    6/16/23 -- Charo is doing well no chest pain no shortness of breath.  gets dizzy still at times but ok at the moment.  2/2/24 -- Patient was seen and evaluated today. feels well this is our last visit as she is moving to [place].

## 2024-02-29 ENCOUNTER — RX RENEWAL (OUTPATIENT)
Age: 84
End: 2024-02-29

## 2024-02-29 RX ORDER — ROSUVASTATIN CALCIUM 5 MG/1
5 TABLET, FILM COATED ORAL
Qty: 90 | Refills: 0 | Status: ACTIVE | COMMUNITY
Start: 2021-04-16 | End: 1900-01-01

## 2024-03-14 ENCOUNTER — APPOINTMENT (OUTPATIENT)
Dept: INTERNAL MEDICINE | Facility: CLINIC | Age: 84
End: 2024-03-14
Payer: MEDICARE

## 2024-03-14 ENCOUNTER — NON-APPOINTMENT (OUTPATIENT)
Age: 84
End: 2024-03-14

## 2024-03-14 VITALS
DIASTOLIC BLOOD PRESSURE: 68 MMHG | HEIGHT: 65 IN | WEIGHT: 152 LBS | TEMPERATURE: 98.1 F | OXYGEN SATURATION: 97 % | SYSTOLIC BLOOD PRESSURE: 114 MMHG | BODY MASS INDEX: 25.33 KG/M2 | RESPIRATION RATE: 16 BRPM | HEART RATE: 82 BPM

## 2024-03-14 DIAGNOSIS — E78.5 HYPERLIPIDEMIA, UNSPECIFIED: ICD-10-CM

## 2024-03-14 DIAGNOSIS — M19.049 PRIMARY OSTEOARTHRITIS, UNSPECIFIED HAND: ICD-10-CM

## 2024-03-14 DIAGNOSIS — I10 ESSENTIAL (PRIMARY) HYPERTENSION: ICD-10-CM

## 2024-03-14 PROCEDURE — 93000 ELECTROCARDIOGRAM COMPLETE: CPT

## 2024-03-14 PROCEDURE — 99214 OFFICE O/P EST MOD 30 MIN: CPT

## 2024-03-14 PROCEDURE — G2211 COMPLEX E/M VISIT ADD ON: CPT

## 2024-04-25 ENCOUNTER — NON-APPOINTMENT (OUTPATIENT)
Age: 84
End: 2024-04-25

## 2024-10-01 ENCOUNTER — NON-APPOINTMENT (OUTPATIENT)
Age: 84
End: 2024-10-01

## 2024-12-16 NOTE — COUNSELING
9/9/24 Patient is a pleasant 59-year-old female who we have been referred by Dr. Ronal Britton for abdominal pain and bloating.  A copy this note along with recommendations will be sent referring provider.  We initially saw patient in 2019 and she had a workup for hiatal hernia.  Today, she presents with alternating constipation and diarrhea, abdominal pain, and melena. 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  I believe she is on oral iron, but her melena predates starting this. In terms of her constipation and loose stool, this has been ongoing for several years.  She will have no bowel movement for a week followed by few days of loose stool.  Her past medical history is pertinent for hysterectomy as well as cholecystectomy.  We discussed the possibility of adhesions.  She was having constipation in 2019 and was placed on Linzess, but she does not recall taking this.  Abdominal pain seems related to constipation.  She did have a recent CT abdomen and pelvis in the [facility] 2024 with some gastric wall thickening, but otherwise unrevealing. 10/15/24 EGD with rou en y and HP. reflux on esophageal bx 10/15/24 Colonoscopy wnl 12/16/24 Patient returns to her follow up of endoscopy colonoscopy for abdominal pain, bloating, constipation. Artie is moving her vows with Linzess. Still having some epigastric pain. She does feel better following the antibiotics for H pylori, but still having some ongoing gi symptoms. sb [Weight management counseling provided] : Weight management [Healthy eating counseling provided] : healthy eating [Activity counseling provided] : activity